# Patient Record
Sex: MALE | Race: OTHER | HISPANIC OR LATINO | Employment: FULL TIME | URBAN - METROPOLITAN AREA
[De-identification: names, ages, dates, MRNs, and addresses within clinical notes are randomized per-mention and may not be internally consistent; named-entity substitution may affect disease eponyms.]

---

## 2018-09-08 ENCOUNTER — APPOINTMENT (EMERGENCY)
Dept: CT IMAGING | Facility: HOSPITAL | Age: 33
End: 2018-09-08

## 2018-09-08 ENCOUNTER — HOSPITAL ENCOUNTER (EMERGENCY)
Facility: HOSPITAL | Age: 33
Discharge: HOME | End: 2018-09-08
Attending: EMERGENCY MEDICINE | Admitting: EMERGENCY MEDICINE

## 2018-09-08 VITALS
HEART RATE: 62 BPM | DIASTOLIC BLOOD PRESSURE: 95 MMHG | BODY MASS INDEX: 23.43 KG/M2 | SYSTOLIC BLOOD PRESSURE: 132 MMHG | TEMPERATURE: 98.4 F | RESPIRATION RATE: 16 BRPM | WEIGHT: 154.1 LBS | OXYGEN SATURATION: 96 %

## 2018-09-08 DIAGNOSIS — R51.9 HEADACHE: Primary | ICD-10-CM

## 2018-09-08 PROCEDURE — 96375 TX/PRO/DX INJ NEW DRUG ADDON: CPT

## 2018-09-08 PROCEDURE — 99284 EMERGENCY DEPT VISIT MOD MDM: CPT

## 2018-09-08 PROCEDURE — 96365 THER/PROPH/DIAG IV INF INIT: CPT

## 2018-09-08 PROCEDURE — 70450 CT HEAD/BRAIN W/O DYE: CPT

## 2018-09-08 PROCEDURE — 96374 THER/PROPH/DIAG INJ IV PUSH: CPT

## 2018-09-08 PROCEDURE — 96361 HYDRATE IV INFUSION ADD-ON: CPT

## 2018-09-08 RX ORDER — METOCLOPRAMIDE HYDROCHLORIDE 5 MG/ML
10 INJECTION INTRAMUSCULAR; INTRAVENOUS ONCE
Status: COMPLETED | OUTPATIENT
Start: 2018-09-08 | End: 2018-09-08

## 2018-09-08 RX ORDER — DIPHENHYDRAMINE HYDROCHLORIDE 50 MG/ML
25 INJECTION INTRAMUSCULAR; INTRAVENOUS ONCE
Status: COMPLETED | OUTPATIENT
Start: 2018-09-08 | End: 2018-09-08

## 2018-09-08 RX ORDER — MAGNESIUM SULFATE HEPTAHYDRATE 40 MG/ML
2 INJECTION, SOLUTION INTRAVENOUS ONCE
Status: COMPLETED | OUTPATIENT
Start: 2018-09-08 | End: 2018-09-08

## 2018-09-08 RX ORDER — KETOROLAC TROMETHAMINE 30 MG/ML
30 INJECTION, SOLUTION INTRAMUSCULAR; INTRAVENOUS ONCE
Status: COMPLETED | OUTPATIENT
Start: 2018-09-08 | End: 2018-09-08

## 2018-09-08 RX ADMIN — MAGNESIUM SULFATE HEPTAHYDRATE 2 G: 40 INJECTION, SOLUTION INTRAVENOUS at 18:57

## 2018-09-08 RX ADMIN — KETOROLAC TROMETHAMINE 30 MG: 30 INJECTION, SOLUTION INTRAMUSCULAR at 18:56

## 2018-09-08 RX ADMIN — METOCLOPRAMIDE 10 MG: 5 INJECTION, SOLUTION INTRAMUSCULAR; INTRAVENOUS at 18:56

## 2018-09-08 RX ADMIN — DIPHENHYDRAMINE HYDROCHLORIDE 25 MG: 50 INJECTION, SOLUTION INTRAMUSCULAR; INTRAVENOUS at 18:56

## 2018-09-08 RX ADMIN — SODIUM CHLORIDE 1000 ML: 0.9 INJECTION, SOLUTION INTRAVENOUS at 18:55

## 2018-09-08 NOTE — ED PROVIDER NOTES
History  Chief Complaint   Patient presents with    Headache     frontal headache since this am , +nausea and vomited X1, +photophobia     65-year-old male presents today complaining of a frontal headache which started this morning  Took 200 mg of ibuprofen without relief  Headache was gradual in onset  Similar to prior headaches but worse  History provided by:  Patient  Headache   Pain location:  Frontal  Quality:  Dull  Radiates to:  Does not radiate  Severity currently:  8/10  Severity at highest:  8/10  Onset quality:  Gradual  Timing:  Constant  Progression:  Unchanged  Chronicity:  New  Similar to prior headaches: yes    Relieved by:  Nothing  Worsened by:  Nothing  Ineffective treatments: Took 200 mg of ibuprofen without relief  Associated symptoms: nausea, photophobia and vomiting    Associated symptoms: no abdominal pain, no back pain, no blurred vision, no congestion, no cough, no diarrhea, no dizziness, no drainage, no ear pain, no eye pain, no facial pain, no fatigue, no fever, no focal weakness, no hearing loss, no loss of balance, no myalgias, no near-syncope, no neck pain, no neck stiffness, no numbness, no paresthesias, no seizures, no sinus pressure, no sore throat, no swollen glands, no syncope, no tingling, no URI, no visual change and no weakness    Risk factors: no anger, no family hx of SAH, does not have insomnia and lifestyle not sedentary        None       History reviewed  No pertinent past medical history  History reviewed  No pertinent surgical history  History reviewed  No pertinent family history  I have reviewed and agree with the history as documented  Social History   Substance Use Topics    Smoking status: Never Smoker    Smokeless tobacco: Never Used    Alcohol use No        Review of Systems   Constitutional: Negative for fatigue and fever  HENT: Negative for congestion, ear pain, hearing loss, postnasal drip, sinus pressure and sore throat      Eyes: Positive for photophobia  Negative for blurred vision and pain  Respiratory: Negative for cough  Cardiovascular: Negative for syncope and near-syncope  Gastrointestinal: Positive for nausea and vomiting  Negative for abdominal pain and diarrhea  Genitourinary: Negative for difficulty urinating  Musculoskeletal: Negative for back pain, myalgias, neck pain and neck stiffness  Skin: Negative for pallor, rash and wound  Allergic/Immunologic: Negative for immunocompromised state  Neurological: Positive for headaches  Negative for dizziness, focal weakness, seizures, weakness, numbness, paresthesias and loss of balance  Psychiatric/Behavioral: Negative for confusion  Physical Exam  Physical Exam   Constitutional: He is oriented to person, place, and time  He appears well-developed and well-nourished  He appears distressed (Appears uncomfortable)  HENT:   Head: Normocephalic and atraumatic  Mouth/Throat: Uvula is midline, oropharynx is clear and moist and mucous membranes are normal  No tonsillar exudate  Eyes: EOM are normal  Pupils are equal, round, and reactive to light  Significant photophobia   Neck: Normal range of motion  Neck supple  No neck rigidity  No Brudzinski's sign and no Kernig's sign noted  Cardiovascular: Normal rate and regular rhythm  Pulmonary/Chest: Effort normal and breath sounds normal    Abdominal: Soft  Bowel sounds are normal  There is no tenderness  There is no rebound and no guarding  Musculoskeletal: Normal range of motion  Neurological: He is alert and oriented to person, place, and time  No neurologic deficits  Speech is clear and not slurred  No word finding issues  Strength equal upper extremities b/l  Strength equal in lower extremities b/l  Able to hold extremities against gravity x 10 seconds without drift x 4  No pronator drift  No sensory deficit  Skin: Skin is warm and dry  Capillary refill takes less than 2 seconds  Psychiatric: He has a normal mood and affect  Nursing note and vitals reviewed  Vital Signs  ED Triage Vitals   Temperature Pulse Respirations Blood Pressure SpO2   09/08/18 1837 09/08/18 1837 09/08/18 1837 09/08/18 1837 09/08/18 1837   98 4 °F (36 9 °C) 78 16 149/86 98 %      Temp Source Heart Rate Source Patient Position - Orthostatic VS BP Location FiO2 (%)   09/08/18 1837 09/08/18 2003 09/08/18 1837 09/08/18 1837 --   Oral Monitor Lying Right arm       Pain Score       09/08/18 1837       8           Vitals:    09/08/18 1837 09/08/18 2003 09/08/18 2030   BP: 149/86 130/76 132/95   Pulse: 78 58 62   Patient Position - Orthostatic VS: Lying Lying Lying       Visual Acuity      ED Medications  Medications   sodium chloride 0 9 % bolus 1,000 mL (0 mL Intravenous Stopped 9/8/18 2000)   diphenhydrAMINE (BENADRYL) injection 25 mg (25 mg Intravenous Given 9/8/18 1856)   metoclopramide (REGLAN) injection 10 mg (10 mg Intravenous Given 9/8/18 1856)   ketorolac (TORADOL) injection 30 mg (30 mg Intravenous Given 9/8/18 1856)   magnesium sulfate 2 g/50 mL IVPB (premix) 2 g (0 g Intravenous Stopped 9/8/18 1957)       Diagnostic Studies  Results Reviewed     None                 CT head wo contrast   Final Result by Elayne Mcgovern MD (09/08 2000)   1  No acute intracranial abnormality  2   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 is suboptimally evaluated  Nonemergent MR evaluation may be considered as previously suggested  Workstation performed: [id number]                    Procedures  Procedures       Phone Contacts  ED Phone Contact    ED Course                               MDM  Number of Diagnoses or Management Options  Headache: new and requires workup  Diagnosis management comments: 7:57 PM  Feeling better after migraine cocktail  Headache resolved  Waiting for CT head         Amount and/or Complexity of Data Reviewed  Tests in the radiology section of CPT®: ordered and reviewed  Independent visualization of images, tracings, or specimens: yes    Risk of Complications, Morbidity, and/or Mortality  Presenting problems: high  Diagnostic procedures: high  Management options: moderate    Patient Progress  Patient progress: improved    CritCare Time    Disposition  Final diagnoses:   Headache     Time reflects when diagnosis was documented in both MDM as applicable and the Disposition within this note     Time User Action Codes Description Comment    9/8/2018  6:48 PM Rubin Brandt Add [R51] Headache       ED Disposition     ED Disposition Condition Comment    Discharge  Rajesh Cummings discharge to home/self care  Condition at discharge: Stable        Follow-up Information     Follow up With Specialties Details Why Contact Info Additional [address] Emergency Department Emergency Medicine  If symptoms worsen [address] [facility]  AN ED,  [address], [place], [place], [address]    your PCP  Schedule an appointment as soon as possible for a visit             There are no discharge medications for this patient  No discharge procedures on file      ED Provider  Electronically Signed by           Shaq Hummel DO  09/09/18 0528

## 2018-09-08 NOTE — DISCHARGE INSTRUCTIONS
Acute Headache   WHAT YOU NEED TO KNOW:   An acute headache is pain or discomfort that starts suddenly and gets worse quickly  You may have an acute headache only when you feel stress or eat certain foods  Other acute headache pain can happen every day, and sometimes several times a day  DISCHARGE INSTRUCTIONS:   Return to the emergency department if:   · You have severe pain  · You have numbness or weakness on one side of your face or body  · You have a headache that occurs after a blow to the head, a fall, or other trauma  · You have a headache, are forgetful or confused, or have trouble speaking  · You have a headache, stiff neck, and a fever  Contact your healthcare provider if:   · You have a constant headache and are vomiting  · You have a headache each day that does not get better, even after treatment  · You have changes in your headaches, or new symptoms that occur when you have a headache  · You have questions or concerns about your condition or care  Medicines: You may need any of the following:  · Prescription pain medicine  may be given  The medicine your healthcare provider recommends will depend on the kind of headaches you have  You will need to take prescription headache medicines as directed to prevent a problem called rebound headache  These headaches happen with regular use of pain relievers for headache disorders  · NSAIDs , such as ibuprofen, help decrease swelling, pain, and fever  This medicine is available with or without a doctor's order  NSAIDs can cause stomach bleeding or kidney problems in certain people  If you take blood thinner medicine, always ask your healthcare provider if NSAIDs are safe for you  Always read the medicine label and follow directions  · Acetaminophen  decreases pain and fever  It is available without a doctor's order  Ask how much to take and how often to take it  Follow directions   Read the labels of all other medicines you are using to see if they also contain acetaminophen, or ask your doctor or pharmacist  Acetaminophen can cause liver damage if not taken correctly  Do not use more than 3 grams (3,000 milligrams) total of acetaminophen in one day  · Antidepressants  may be given for some kinds of headaches  · Take your medicine as directed  Contact your healthcare provider if you think your medicine is not helping or if you have side effects  Tell him or her if you are allergic to any medicine  Keep a list of the medicines, vitamins, and herbs you take  Include the amounts, and when and why you take them  Bring the list or the pill bottles to follow-up visits  Carry your medicine list with you in case of an emergency  Manage your symptoms:   · Apply heat or ice  on the headache area  Use a heat or ice pack  For an ice pack, you can also put crushed ice in a plastic bag  Cover the pack or bag with a towel before you apply it to your skin  Ice and heat both help decrease pain, and heat also helps decrease muscle spasms  Apply heat for 20 to 30 minutes every 2 hours  Apply ice for 15 to 20 minutes every hour  Apply heat or ice for as long and for as many days as directed  You may alternate heat and ice  · Relax your muscles  Lie down in a comfortable position and close your eyes  Relax your muscles slowly  Start at your toes and work your way up your body  · Keep a record of your headaches  Write down when your headaches start and stop  Include your symptoms and what you were doing when the headache began  Record what you ate or drank for 24 hours before the headache started  Describe the pain and where it hurts  Keep track of what you did to treat your headache and if it worked  Prevent an acute headache:   · Avoid anything that triggers an acute headache  Examples include exposure to chemicals, going to high altitude, or not getting enough sleep  Create a regular sleep routine   Go to sleep at the same time and wake up at the same time each day  Do not use electronic devices before bedtime  These may trigger a headache or prevent you from sleeping well  · Do not smoke  Nicotine and other chemicals in cigarettes and cigars can trigger an acute headache or make it worse  Ask your healthcare provider for information if you currently smoke and need help to quit  E-cigarettes or smokeless tobacco still contain nicotine  Talk to your healthcare provider before you use these products  · Limit alcohol as directed  Alcohol can trigger an acute headache or make it worse  If you have cluster headaches, do not drink alcohol during an episode  For other types of headaches, ask your healthcare provider if it is safe for you to drink alcohol  Ask how much is safe for you to drink, and how often  · Exercise as directed  Exercise can reduce tension and help with headache pain  Aim for 30 minutes of physical activity on most days of the week  Your healthcare provider can help you create an exercise plan  · Eat a variety of healthy foods  Healthy foods include fruits, vegetables, low-fat dairy products, lean meats, fish, whole grains, and cooked beans  Your healthcare provider or dietitian can help you create meals plans if you need to avoid foods that trigger headaches  Follow up with your healthcare provider as directed:  Bring your headache record with you when you see your healthcare provider  Write down your questions so you remember to ask them during your visits  © 2017 [facility] Information is for End User's use only and may not be sold, redistributed or otherwise used for commercial purposes  All illustrations and images included in CareNotes® are the copyrighted property of [employer] M [employer]  or [employer]  The above information is an  only  It is not intended as medical advice for individual conditions or treatments   Talk to your doctor, nurse or pharmacist before following any medical regimen to see if it is safe and effective for you

## 2018-09-18 ENCOUNTER — HOSPITAL ENCOUNTER (EMERGENCY)
Facility: HOSPITAL | Age: 33
Discharge: HOME | End: 2018-09-18
Attending: EMERGENCY MEDICINE | Admitting: EMERGENCY MEDICINE

## 2018-09-18 VITALS
OXYGEN SATURATION: 97 % | SYSTOLIC BLOOD PRESSURE: 103 MMHG | WEIGHT: 135 LBS | TEMPERATURE: 98.2 F | RESPIRATION RATE: 18 BRPM | DIASTOLIC BLOOD PRESSURE: 60 MMHG | BODY MASS INDEX: 20.53 KG/M2 | HEART RATE: 68 BPM

## 2018-09-18 DIAGNOSIS — R51.9 HEADACHE: Primary | ICD-10-CM

## 2018-09-18 PROCEDURE — 99283 EMERGENCY DEPT VISIT LOW MDM: CPT

## 2018-09-18 PROCEDURE — 96375 TX/PRO/DX INJ NEW DRUG ADDON: CPT

## 2018-09-18 PROCEDURE — 96365 THER/PROPH/DIAG IV INF INIT: CPT

## 2018-09-18 RX ORDER — METOCLOPRAMIDE HYDROCHLORIDE 5 MG/ML
10 INJECTION INTRAMUSCULAR; INTRAVENOUS ONCE
Status: COMPLETED | OUTPATIENT
Start: 2018-09-18 | End: 2018-09-18

## 2018-09-18 RX ORDER — MAGNESIUM SULFATE HEPTAHYDRATE 40 MG/ML
2 INJECTION, SOLUTION INTRAVENOUS ONCE
Status: COMPLETED | OUTPATIENT
Start: 2018-09-18 | End: 2018-09-18

## 2018-09-18 RX ORDER — KETOROLAC TROMETHAMINE 30 MG/ML
30 INJECTION, SOLUTION INTRAMUSCULAR; INTRAVENOUS ONCE
Status: COMPLETED | OUTPATIENT
Start: 2018-09-18 | End: 2018-09-18

## 2018-09-18 RX ORDER — DIPHENHYDRAMINE HYDROCHLORIDE 50 MG/ML
50 INJECTION INTRAMUSCULAR; INTRAVENOUS ONCE
Status: COMPLETED | OUTPATIENT
Start: 2018-09-18 | End: 2018-09-18

## 2018-09-18 RX ADMIN — MAGNESIUM SULFATE HEPTAHYDRATE 2 G: 40 INJECTION, SOLUTION INTRAVENOUS at 06:15

## 2018-09-18 RX ADMIN — METOCLOPRAMIDE 10 MG: 5 INJECTION, SOLUTION INTRAMUSCULAR; INTRAVENOUS at 06:08

## 2018-09-18 RX ADMIN — SODIUM CHLORIDE 1000 ML: 0.9 INJECTION, SOLUTION INTRAVENOUS at 06:07

## 2018-09-18 RX ADMIN — DIPHENHYDRAMINE HYDROCHLORIDE 50 MG: 50 INJECTION, SOLUTION INTRAMUSCULAR; INTRAVENOUS at 06:11

## 2018-09-18 RX ADMIN — KETOROLAC TROMETHAMINE 30 MG: 30 INJECTION, SOLUTION INTRAMUSCULAR at 06:10

## 2018-09-18 NOTE — DISCHARGE INSTRUCTIONS
Acute Headache, Ambulatory Care   GENERAL INFORMATION:   An acute headache  is pain or discomfort that starts suddenly and gets worse quickly  The cause of an acute headache may not be known  It may be triggered by stress, fatigue, hormones, food, or trauma  Common related symptoms include the following:   · Fever    · Sinus pressure    · Loss of memory    · Nausea or vomiting    · Problems with your vision, such as watery or red eyes, loss of vision, or pain in bright light    · Stiff neck    · Tenderness of the head and neck area    · Trouble staying awake, or being less alert than usual     · Weakness or less energy  Seek immediate care for the following symptoms:   · Severe pain    · A headache that occurs after a blow to the head, a fall, or other trauma     · Confusion or forgetfulness    · Numbness on one side of your face or body  Treatment for an acute headache  may include medicine to decrease pain  You may also need biofeedback or cognitive behavioral therapy  Ask your healthcare provider about these and other treatments for an acute headache  Manage my symptoms:   · Apply heat  on your head for 20 to 30 minutes every 2 hours for as many days as directed  Heat helps decrease pain and muscle spasms  You may alternate heat and ice  · Apply ice  on your head for 15 to 20 minutes every hour or as directed  Use an ice pack, or put crushed ice in a plastic bag  Cover it with a towel  Ice helps decrease pain  · Relax your muscles  Lie down in a comfortable position and close your eyes  Relax your muscles slowly  Start at your toes and work your way up your body  · Keep a record of your headaches  Write down when your headaches start and stop  Include your symptoms and what you were doing when the headache began  Record what you ate or drank for 24 hours before the headache started  Describe the pain and where it hurts  Keep track of what you did to treat your headache and whether it worked    Follow up with your healthcare provider as directed:  Bring your headache record with you when you see your healthcare provider  Write down your questions so you remember to ask them during your visits  CARE AGREEMENT:   You have the right to help plan your care  Learn about your health condition and how it may be treated  Discuss treatment options with your caregivers to decide what care you want to receive  You always have the right to refuse treatment  The above information is an  only  It is not intended as medical advice for individual conditions or treatments  Talk to your doctor, nurse or pharmacist before following any medical regimen to see if it is safe and effective for you  © 2014 [address] is for End User's use only and may not be sold, redistributed or otherwise used for commercial purposes  All illustrations and images included in CareNotes® are the copyrighted property of A D A M [employer]  or [employer]

## 2018-09-18 NOTE — ED PROVIDER NOTES
History  Chief Complaint   Patient presents with    Headache     pt c/o headache for 2 weeks on and off  c/o changes in vision at times  +nausea  -vomiting       History provided by:  Patient   used: No    Headache   Associated symptoms: nausea and photophobia    Associated symptoms: no abdominal pain, no congestion, no cough, no diarrhea, no dizziness, no fever, no neck pain, no neck stiffness, no sore throat, no vomiting and no weakness      Patient is a 77-year-old male presenting to emergency department with headache  On off for last 2 weeks  Today it is worse  Nausea  No vomiting  Light bothers his eyes  Had similar episode over 2 weeks ago  Was evaluated in the ER, had negative CT head  Improved symptomatically  Has not seen a neurologist   No neck pain  No fevers or chills  No trauma  No chest pain shortness of breath  MDM headache, will treat symptomatically, re-evaluate        None       History reviewed  No pertinent past medical history  History reviewed  No pertinent surgical history  History reviewed  No pertinent family history  I have reviewed and agree with the history as documented  Social History   Substance Use Topics    Smoking status: Never Smoker    Smokeless tobacco: Never Used    Alcohol use No        Review of Systems   Constitutional: Negative for chills, diaphoresis and fever  HENT: Negative for congestion and sore throat  Eyes: Positive for photophobia  Respiratory: Negative for cough, shortness of breath, wheezing and stridor  Cardiovascular: Negative for chest pain, palpitations and leg swelling  Gastrointestinal: Positive for nausea  Negative for abdominal pain, blood in stool, diarrhea and vomiting  Genitourinary: Negative for dysuria, frequency and urgency  Musculoskeletal: Negative for neck pain and neck stiffness  Skin: Negative for pallor and rash  Neurological: Positive for headaches   Negative for dizziness, syncope, weakness and light-headedness  All other systems reviewed and are negative  Physical Exam  Physical Exam   Constitutional: He is oriented to person, place, and time  He appears well-developed and well-nourished  HENT:   Head: Normocephalic and atraumatic  Eyes: Pupils are equal, round, and reactive to light  Neck: Neck supple  Cardiovascular: Normal rate, regular rhythm, normal heart sounds and intact distal pulses  Pulmonary/Chest: Effort normal and breath sounds normal  No respiratory distress  Abdominal: Soft  Bowel sounds are normal  There is no tenderness  Musculoskeletal: Normal range of motion  He exhibits no edema or tenderness  Neurological: He is alert and oriented to person, place, and time  No cranial nerve deficit or sensory deficit  He exhibits normal muscle tone  Coordination normal    nonfocal   Skin: Skin is warm and dry  Capillary refill takes less than 2 seconds  No erythema  No pallor  Vitals reviewed        Vital Signs  ED Triage Vitals   Temperature Pulse Respirations Blood Pressure SpO2   09/18/18 0511 09/18/18 0453 09/18/18 0453 09/18/18 0453 09/18/18 0453   98 2 °F (36 8 °C) 74 16 136/82 98 %      Temp Source Heart Rate Source Patient Position - Orthostatic VS BP Location FiO2 (%)   09/18/18 0453 09/18/18 0700 09/18/18 0700 09/18/18 0700 --   Oral Monitor Lying Right arm       Pain Score       09/18/18 0453       8           Vitals:    09/18/18 0453 09/18/18 0700   BP: 136/82 103/60   Pulse: 74 68   Patient Position - Orthostatic VS:  Lying       Visual Acuity      ED Medications  Medications   diphenhydrAMINE (BENADRYL) injection 50 mg (50 mg Intravenous Given 9/18/18 0611)   ketorolac (TORADOL) injection 30 mg (30 mg Intravenous Given 9/18/18 0610)   metoclopramide (REGLAN) injection 10 mg (10 mg Intravenous Given 9/18/18 0608)   sodium chloride 0 9 % bolus 1,000 mL (0 mL Intravenous Stopped 9/18/18 0713)   magnesium sulfate 2 g/50 mL IVPB (premix) 2 g (0 g Intravenous Stopped 9/18/18 7920)       Diagnostic Studies  Results Reviewed     None                 No orders to display              Procedures  Procedures       Phone Contacts  ED Phone Contact    ED Course  ED Course as of Sep 20 1253   Tue Sep 18, 2018   8261 Headache improved  Wants to go home  Does not want any more medications  Will discharge home  [facility]  CritCare Time    Disposition  Final diagnoses:   Headache     Time reflects when diagnosis was documented in both MDM as applicable and the Disposition within this note     Time User Action Codes Description Comment    9/18/2018  6:32 AM Nava Cortez [R51] Headache       ED Disposition     ED Disposition Condition Comment    Discharge  Alana Reyes discharge to home/self care  Condition at discharge: Good        Follow-up Information     Follow up With Specialties Details Why Contact Info Additional [address] Emergency Department Emergency Medicine  As needed, If symptoms worsen [address] [facility] [address] [facility] [address]  AN ED, [address], [place], [address] [facility] Schedule an appointment as soon as possible for a visit in 5 days headaches and need MRI outpatient [address] [facility] [address] [phone]     [employer]  Call to get a family doctor [phone]             There are no discharge medications for this patient  No discharge procedures on file      ED Provider  Electronically Signed by           Vinod Mcmanus MD  09/20/18 3174

## 2022-07-21 ENCOUNTER — HOSPITAL ENCOUNTER (EMERGENCY)
Facility: HOSPITAL | Age: 37
Discharge: HOME | End: 2022-07-21
Attending: EMERGENCY MEDICINE | Admitting: EMERGENCY MEDICINE

## 2022-07-21 VITALS
SYSTOLIC BLOOD PRESSURE: 137 MMHG | TEMPERATURE: 98 F | OXYGEN SATURATION: 100 % | HEART RATE: 75 BPM | RESPIRATION RATE: 20 BRPM | DIASTOLIC BLOOD PRESSURE: 103 MMHG

## 2022-07-21 DIAGNOSIS — M79.632 LEFT FOREARM PAIN: ICD-10-CM

## 2022-07-21 DIAGNOSIS — M25.562 KNEE PAIN, LEFT: Primary | ICD-10-CM

## 2022-07-21 PROCEDURE — 99284 EMERGENCY DEPT VISIT MOD MDM: CPT | Performed by: EMERGENCY MEDICINE

## 2022-07-21 PROCEDURE — 99283 EMERGENCY DEPT VISIT LOW MDM: CPT

## 2022-07-21 NOTE — ED PROVIDER NOTES
History  Chief Complaint   Patient presents with    Leg Pain     Left leg buckled while at work today  C/o of left knee pain  Reports started with left arm soreness for one week  Patient is a 44year old male with no significant past medical history, presenting to the ED for evaluation left knee pain and left pain  Patient states that he was at work today walking around and his left knee buckled underneath him  Patient did not fall to the ground, did note some pain while ambulating for the rest the day today  Patient also notes that for the last week he has had some left arm soreness particularly the in the left forearm that radiates to the wrist area  No trauma to the arm  No rashes or wounds  Patient still has full range of motion of the shoulder  Patient does state that he lifts heavy objects at work and is constantly using both of his arms at work  Patient denies any weakness in the arms  He denies any fevers, chills, recent tick bites, chest pain, shortness of breath, abdominal symptoms, focal extremity weakness  He occasionally has some tingling in the left fingers, but is very transient  Patient states that he was still able to ambulate on his knee today but with pain, prompting ED evaluation  None       History reviewed  No pertinent past medical history  History reviewed  No pertinent surgical history  History reviewed  No pertinent family history  I have reviewed and agree with the history as documented  E-Cigarette/Vaping     E-Cigarette/Vaping Substances     Social History     Tobacco Use    Smoking status: Never Smoker    Smokeless tobacco: Never Used   Substance Use Topics    Alcohol use: No    Drug use: No        Review of Systems   Constitutional: Negative for chills and fever  HENT: Negative for ear pain and sore throat  Eyes: Negative for pain and visual disturbance  Respiratory: Negative for cough and shortness of breath      Cardiovascular: Negative for chest pain and palpitations  Gastrointestinal: Negative for abdominal pain and vomiting  Genitourinary: Negative for dysuria and hematuria  Musculoskeletal: Positive for arthralgias and myalgias  Negative for back pain  Skin: Negative for color change and rash  Neurological: Negative for dizziness, seizures, syncope and headaches  All other systems reviewed and are negative  Physical Exam  ED Triage Vitals [07/21/22 1619]   Temperature Pulse Respirations Blood Pressure SpO2   98 °F (36 7 °C) 75 20 (!) 137/103 100 %      Temp Source Heart Rate Source Patient Position - Orthostatic VS BP Location FiO2 (%)   Oral Monitor Sitting Left arm --      Pain Score       6             Orthostatic Vital Signs  Vitals:    07/21/22 1619   BP: (!) 137/103   Pulse: 75   Patient Position - Orthostatic VS: Sitting       Physical Exam  Vitals and nursing note reviewed  Constitutional:       General: He is not in acute distress  Appearance: Normal appearance  He is well-developed and normal weight  He is not ill-appearing, toxic-appearing or diaphoretic  HENT:      Head: Normocephalic and atraumatic  Right Ear: External ear normal       Left Ear: External ear normal       Nose: Nose normal  No congestion  Mouth/Throat:      Mouth: Mucous membranes are moist       Pharynx: Oropharynx is clear  Eyes:      General: No scleral icterus  Extraocular Movements: Extraocular movements intact  Conjunctiva/sclera: Conjunctivae normal    Cardiovascular:      Rate and Rhythm: Normal rate and regular rhythm  Pulses: Normal pulses  Heart sounds: Normal heart sounds  No murmur heard  Pulmonary:      Effort: Pulmonary effort is normal  No respiratory distress  Breath sounds: Normal breath sounds  Abdominal:      General: Abdomen is flat  Palpations: Abdomen is soft  Tenderness: There is no abdominal tenderness     Musculoskeletal:      Cervical back: Normal range of motion and neck supple  No tenderness  Right knee: Normal       Left knee: No swelling, deformity, effusion, erythema, ecchymosis, lacerations, bony tenderness or crepitus  Normal range of motion  Tenderness present over the lateral joint line  No LCL laxity, MCL laxity, ACL laxity or PCL laxity  Normal alignment, normal meniscus and normal patellar mobility  Normal pulse  Comments: Patient has some mild soreness in the musculature of the left forearm, with pain exacerbated with flexion and extension of the wrist as well as pronation supination of the elbow  Pulses are normal   There is no palpable swelling no bruising  Patient has full range of motion of the shoulder without pain  Skin:     General: Skin is warm and dry  Findings: No erythema or rash  Neurological:      General: No focal deficit present  Mental Status: He is alert and oriented to person, place, and time  Cranial Nerves: No cranial nerve deficit  Sensory: No sensory deficit  Motor: No weakness  Gait: Gait normal    Psychiatric:         Mood and Affect: Mood normal          ED Medications  Medications - No data to display    Diagnostic Studies  Results Reviewed     None                 No orders to display         Procedures  Procedures      ED Course        given absence of trauma, discussed the benefits and limitations of getting an x-ray  I explained to the patient that he likely does not have any acute injury to the knee  Shared decision making was used to defer x-ray at this time  I offered patient an Ace wrap for his knee for stability  He is agreeable with this  Patient states that he does not have medical insurance at this time and prefers to treat his pain with Motrin and Tylenol at home    I discussed with patient that should he continue to have pain despite use of the Ace wrap and some supportive care at home including ice, elevation, anti-inflammatories, that he should come back to the ED for potential x-ray as well as ortho consult  Patient was provided with an Ortho consult on discharge paperwork  Regarding his left arm pain, I believe that this is mostly due to inflammatory changes and potentially tendonitis given overuse syndrome at work  Advised him that the motion Tylenol that he takes for his knee will also work for his inflammation in the left arm  Should patient continue to experience pain, numbness and tingling, or reduced function of the left arm, he should be seen in the ED immediately for re-evaluation  Patient is agreeable at with the plan and I verbalized understanding  Ace wrap was applied by ED RN, and patient was observed ambulatory from the ED without issue  SBIRT 20yo+    Flowsheet Row Most Recent Value   SBIRT (25 yo +)    In order to provide better care to our patients, we are screening all of our patients for alcohol and drug use  Would it be okay to ask you these screening questions? No Filed at: 07/21/2022 1816                MDM    Disposition  Final diagnoses:   Knee pain, left   Left forearm pain     Time reflects when diagnosis was documented in both MDM as applicable and the Disposition within this note     Time User Action Codes Description Comment    7/21/2022  6:46 PM Harmeet Forge Add [M25 562] Knee pain, left     7/21/2022  6:47 PM Harmeet Forge Add [M12 561] Left forearm pain     7/21/2022  6:47 PM Dapmaximinoe Shiraz [X50  3XXA] Overuse injury     7/21/2022  6:47 PM Aneita Shutters [X50  3XXA] Overuse injury       ED Disposition     ED Disposition   Discharge    Condition   Stable    Date/Time   Thu Jul 21, 2022  6:46 PM    Comment   Trey Reyes discharge to home/self care                 Follow-up Information     Follow up With Specialties Details Why Contact Info Additional [phone] [facility] Orthopedic Surgery Call  If symptoms worsen [address] [phone] [address], [address], [address] [facility], [address]  Use Entrance A           There are no discharge medications for this patient  PDMP Review     None           ED Provider  Attending physically available and evaluated Johnsuhas Apple Aric  I managed the patient along with the ED Attending      Electronically Signed by         Yadira Louise DO  07/21/22 2001

## 2022-07-21 NOTE — DISCHARGE INSTRUCTIONS
Please use ace wrap as discussed for stability  Can remove at night  Use ice and elevate the leg to reduce any potential swelling  Motrin and Tylenol for pain control both for the knee and the L arm  I believe the L arm pain is associated with some mild inflammation in the elbow and flexor/extensor muscles of the forearm likely due to overuse at work  I have given you ortho follow up  Please see how the knee is doing over the next few days and if pain does not start to improve using therapies discussed above, please return to the ED for further workup  Return to the ED with any new/concerning issues

## 2022-07-21 NOTE — ED ATTENDING ATTESTATION
7/21/2022  Justine BALES DO, saw and evaluated the patient  I have discussed the patient with the resident/non-physician practitioner and agree with the resident's/non-physician practitioner's findings, Plan of Care, and MDM as documented in the resident's/non-physician practitioner's note, except where noted  All available labs and Radiology studies were reviewed  I was present for key portions of any procedure(s) performed by the resident/non-physician practitioner and I was immediately available to provide assistance  At this point I agree with the current assessment done in the Emergency Department  I have conducted an independent evaluation of this patient a history and physical is as follows:  Kristen BALES DO, saw and evaluated the patient  I have discussed the patient with the resident and agree with the resident's findings, Plan of Care, and MDM as documented in the resident's note, except where noted  All available labs and Radiology studies were reviewed  I was present for key portions of any procedure(s) performed by the resident and I was immediately available to provide assistance  At this point I agree with the current assessment done in the Emergency Department  I have conducted an independent evaluation of this patient a history and physical is as follows:    Sonny Mcdermott is an 44y o  year old male, otherwise healthy, who presents to the ED today with L knee pain since earlier today and L arm pain for one week  Arm pain is described as soreness and throughout the arm  He is R-handed  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  No HS, LOC  No numbness, tingling, weakness  No bleeding  No recent illnesses, fevers, n/v/d, new back pain today, or pain anywhere else  ROS otherwise negative  General: NAD   HEENT: normocephalic, atraumatic   MMM   CV: RRR   Pulm: normal work of breathing  GI: abdomen soft, non-distended, non-tender   : deferred   MSK: strength 5/5 in LUE and LLE  No laxity appreciated on stressing of the L knee  Normal gait  No point tenderness in the wrist, hand or elbow  Normal LUE and LLE pulses  No overlying skin changes  Pain in the volar forearm with wrist flexion and extension  Skin: warm and dry   Neuro: awake and alert, moves all extremities with good strength, face symmetric, speech normal   Psych: appropriate mood and affect       MDM  N/v intact, no significant trauma, no instability, able to ambulate  Likely strain/sprain in both areas  Patient declines x-rays at this time    Anticipated Disposition:  D/c with ortho f/u, RTER precautions      I have personally reviewed the laboratory studies and imaging studies as ordered by the resident/ROSAMARIA  I have personally examined the patient              ED Course         Critical Care Time  Procedures

## 2022-09-11 ENCOUNTER — HOSPITAL ENCOUNTER (EMERGENCY)
Facility: HOSPITAL | Age: 37
Discharge: HOME | End: 2022-09-11
Attending: EMERGENCY MEDICINE

## 2022-09-11 VITALS
TEMPERATURE: 98 F | RESPIRATION RATE: 18 BRPM | DIASTOLIC BLOOD PRESSURE: 88 MMHG | HEIGHT: 68 IN | OXYGEN SATURATION: 98 % | SYSTOLIC BLOOD PRESSURE: 140 MMHG | BODY MASS INDEX: 28.9 KG/M2 | WEIGHT: 190.7 LBS | HEART RATE: 81 BPM

## 2022-09-11 DIAGNOSIS — R51.9 HEADACHE: Primary | ICD-10-CM

## 2022-09-11 DIAGNOSIS — R11.0 NAUSEA: ICD-10-CM

## 2022-09-11 PROCEDURE — 96375 TX/PRO/DX INJ NEW DRUG ADDON: CPT

## 2022-09-11 PROCEDURE — 96365 THER/PROPH/DIAG IV INF INIT: CPT

## 2022-09-11 PROCEDURE — 96366 THER/PROPH/DIAG IV INF ADDON: CPT

## 2022-09-11 PROCEDURE — 99283 EMERGENCY DEPT VISIT LOW MDM: CPT

## 2022-09-11 PROCEDURE — 99284 EMERGENCY DEPT VISIT MOD MDM: CPT | Performed by: EMERGENCY MEDICINE

## 2022-09-11 RX ORDER — MAGNESIUM SULFATE HEPTAHYDRATE 40 MG/ML
2 INJECTION, SOLUTION INTRAVENOUS ONCE
Status: COMPLETED | OUTPATIENT
Start: 2022-09-11 | End: 2022-09-11

## 2022-09-11 RX ORDER — METOCLOPRAMIDE HYDROCHLORIDE 5 MG/ML
10 INJECTION INTRAMUSCULAR; INTRAVENOUS ONCE
Status: COMPLETED | OUTPATIENT
Start: 2022-09-11 | End: 2022-09-11

## 2022-09-11 RX ORDER — ACETAMINOPHEN 325 MG/1
975 TABLET ORAL ONCE
Status: COMPLETED | OUTPATIENT
Start: 2022-09-11 | End: 2022-09-11

## 2022-09-11 RX ORDER — KETOROLAC TROMETHAMINE 30 MG/ML
15 INJECTION, SOLUTION INTRAMUSCULAR; INTRAVENOUS ONCE
Status: COMPLETED | OUTPATIENT
Start: 2022-09-11 | End: 2022-09-11

## 2022-09-11 RX ORDER — DIPHENHYDRAMINE HYDROCHLORIDE 50 MG/ML
25 INJECTION INTRAMUSCULAR; INTRAVENOUS ONCE
Status: COMPLETED | OUTPATIENT
Start: 2022-09-11 | End: 2022-09-11

## 2022-09-11 RX ADMIN — DIPHENHYDRAMINE HYDROCHLORIDE 25 MG: 50 INJECTION, SOLUTION INTRAMUSCULAR; INTRAVENOUS at 19:40

## 2022-09-11 RX ADMIN — MAGNESIUM SULFATE HEPTAHYDRATE 2 G: 40 INJECTION, SOLUTION INTRAVENOUS at 19:47

## 2022-09-11 RX ADMIN — SODIUM CHLORIDE 1000 ML: 0.9 INJECTION, SOLUTION INTRAVENOUS at 19:47

## 2022-09-11 RX ADMIN — ACETAMINOPHEN 975 MG: 325 TABLET ORAL at 19:24

## 2022-09-11 RX ADMIN — METOCLOPRAMIDE 10 MG: 5 INJECTION, SOLUTION INTRAMUSCULAR; INTRAVENOUS at 19:42

## 2022-09-11 RX ADMIN — KETOROLAC TROMETHAMINE 15 MG: 30 INJECTION, SOLUTION INTRAMUSCULAR at 19:46
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
## 2022-09-11 NOTE — ED ATTENDING ATTESTATION
9/11/2022  ICassie MD, saw and evaluated the patient  I have discussed the patient with the resident/non-physician practitioner and agree with the resident's/non-physician practitioner's findings, Plan of Care, and MDM as documented in the resident's/non-physician practitioner's note, except where noted  All available labs and Radiology studies were reviewed  I was present for key portions of any procedure(s) performed by the resident/non-physician practitioner and I was immediately available to provide assistance  At this point I agree with the current assessment done in the Emergency Department  I have conducted an independent evaluation of this patient a history and physical is as follows:  Headache, nonfocal examination, CT head from 2018 with 15 mm mass along posterior lateral right medulla  Patient aware  Understands need to follow-up with PCP, referral given  He has no fever, no meningismus  No thunderclap features  No imaging clinically indicated in the emergency department  Will treat with migraine cocktail, reassess and likely discharge  Review of Systems - Negative except headache    Physical Exam  Vitals and nursing note reviewed  Constitutional:       General: He is not in acute distress  Appearance: He is well-developed  He is not diaphoretic  HENT:      Head: Normocephalic and atraumatic  Right Ear: External ear normal       Left Ear: External ear normal    Eyes:      General:         Right eye: No discharge  Left eye: No discharge  Pupils: Pupils are equal, round, and reactive to light  Neck:      Thyroid: No thyromegaly  Trachea: No tracheal deviation  Cardiovascular:      Rate and Rhythm: Normal rate and regular rhythm  Heart sounds: No murmur heard  Pulmonary:      Effort: Pulmonary effort is normal       Breath sounds: Normal breath sounds  Abdominal:      General: Bowel sounds are normal  There is no distension  Palpations: Abdomen is soft  Tenderness: There is no abdominal tenderness  Musculoskeletal:         General: No deformity  Normal range of motion  Cervical back: Normal range of motion and neck supple  Skin:     General: Skin is warm  Capillary Refill: Capillary refill takes less than 2 seconds  Neurological:      Mental Status: He is alert and oriented to person, place, and time  Cranial Nerves: No cranial nerve deficit  Motor: No abnormal muscle tone  Psychiatric:         Behavior: Behavior normal        Primary headache, improved with ER management  Information given follow-up with PCP as patient will likely need an MRI as an outpatient as previously recommended  Nonfocal examination, no indication for emergent CT scan at this point  Patient's symptoms improved  Stable for discharge home  ED Course  ED Course as of 09/11/22 2057   Adelso Shields Sep 11, 2022 2041 Patient reassessed, sleeping comfortably  Will discharge after magnesium complete           Critical Care Time  Procedures

## 2022-11-16 ENCOUNTER — HOSPITAL ENCOUNTER (EMERGENCY)
Facility: HOSPITAL | Age: 37
Discharge: HOME | End: 2022-11-16
Attending: EMERGENCY MEDICINE

## 2022-11-16 ENCOUNTER — APPOINTMENT (EMERGENCY)
Dept: CT IMAGING | Facility: HOSPITAL | Age: 37
End: 2022-11-16

## 2022-11-16 VITALS
TEMPERATURE: 97.5 F | DIASTOLIC BLOOD PRESSURE: 99 MMHG | HEART RATE: 84 BPM | OXYGEN SATURATION: 100 % | SYSTOLIC BLOOD PRESSURE: 157 MMHG | RESPIRATION RATE: 20 BRPM

## 2022-11-16 DIAGNOSIS — K57.92 DIVERTICULITIS: Primary | ICD-10-CM

## 2022-11-16 LAB
ALBUMIN SERPL BCP-MCNC: 4.6 G/DL (ref 3.5–5)
ALP SERPL-CCNC: 66 U/L (ref 34–104)
ALT SERPL W P-5'-P-CCNC: 42 U/L (ref 7–52)
ANION GAP SERPL CALCULATED.3IONS-SCNC: 6 MMOL/L (ref 4–13)
AST SERPL W P-5'-P-CCNC: 20 U/L (ref 13–39)
BASOPHILS # BLD AUTO: 0.04 THOUSANDS/ÂΜL (ref 0–0.1)
BASOPHILS NFR BLD AUTO: 1 % (ref 0–1)
BILIRUB SERPL-MCNC: 0.55 MG/DL (ref 0.2–1)
BUN SERPL-MCNC: 13 MG/DL (ref 5–25)
CALCIUM SERPL-MCNC: 9.1 MG/DL (ref 8.4–10.2)
CHLORIDE SERPL-SCNC: 100 MMOL/L (ref 96–108)
CO2 SERPL-SCNC: 29 MMOL/L (ref 21–32)
CREAT SERPL-MCNC: 0.89 MG/DL (ref 0.6–1.3)
EOSINOPHIL # BLD AUTO: 0.12 THOUSAND/ÂΜL (ref 0–0.61)
EOSINOPHIL NFR BLD AUTO: 2 % (ref 0–6)
ERYTHROCYTE [DISTWIDTH] IN BLOOD BY AUTOMATED COUNT: 12.3 % (ref 11.6–15.1)
GFR SERPL CREATININE-BSD FRML MDRD: 108 ML/MIN/1.73SQ M
GLUCOSE SERPL-MCNC: 88 MG/DL (ref 65–140)
HCT VFR BLD AUTO: 42.5 % (ref 36.5–49.3)
HGB BLD-MCNC: 14.4 G/DL (ref 12–17)
IMM GRANULOCYTES # BLD AUTO: 0.01 THOUSAND/UL (ref 0–0.2)
IMM GRANULOCYTES NFR BLD AUTO: 0 % (ref 0–2)
LIPASE SERPL-CCNC: 19 U/L (ref 11–82)
LYMPHOCYTES # BLD AUTO: 3.52 THOUSANDS/ÂΜL (ref 0.6–4.47)
LYMPHOCYTES NFR BLD AUTO: 50 % (ref 14–44)
MCH RBC QN AUTO: 29.2 PG (ref 26.8–34.3)
MCHC RBC AUTO-ENTMCNC: 33.9 G/DL (ref 31.4–37.4)
MCV RBC AUTO: 86 FL (ref 82–98)
MONOCYTES # BLD AUTO: 0.45 THOUSAND/ÂΜL (ref 0.17–1.22)
MONOCYTES NFR BLD AUTO: 6 % (ref 4–12)
NEUTROPHILS # BLD AUTO: 2.87 THOUSANDS/ÂΜL (ref 1.85–7.62)
NEUTS SEG NFR BLD AUTO: 41 % (ref 43–75)
NRBC BLD AUTO-RTO: 0 /100 WBCS
PLATELET # BLD AUTO: 249 THOUSANDS/UL (ref 149–390)
PMV BLD AUTO: 8.6 FL (ref 8.9–12.7)
POTASSIUM SERPL-SCNC: 3.6 MMOL/L (ref 3.5–5.3)
PROT SERPL-MCNC: 7.6 G/DL (ref 6.4–8.4)
RBC # BLD AUTO: 4.93 MILLION/UL (ref 3.88–5.62)
SODIUM SERPL-SCNC: 135 MMOL/L (ref 135–147)
WBC # BLD AUTO: 7.01 THOUSAND/UL (ref 4.31–10.16)

## 2022-11-16 RX ORDER — CIPROFLOXACIN 500 MG/1
500 TABLET, FILM COATED ORAL ONCE
Status: COMPLETED | OUTPATIENT
Start: 2022-11-16 | End: 2022-11-16

## 2022-11-16 RX ORDER — CIPROFLOXACIN 500 MG/1
500 TABLET, FILM COATED ORAL 2 TIMES DAILY
Qty: 14 TABLET | Refills: 0 | Status: SHIPPED | OUTPATIENT
Start: 2022-11-16 | End: 2022-11-23

## 2022-11-16 RX ORDER — OXYCODONE HYDROCHLORIDE AND ACETAMINOPHEN 5; 325 MG/1; MG/1
1 TABLET ORAL EVERY 6 HOURS PRN
Qty: 12 TABLET | Refills: 0 | Status: SHIPPED | OUTPATIENT
Start: 2022-11-16 | End: 2022-11-26

## 2022-11-16 RX ORDER — ONDANSETRON 4 MG/1
4 TABLET, ORALLY DISINTEGRATING ORAL EVERY 8 HOURS PRN
Qty: 20 TABLET | Refills: 0 | Status: SHIPPED | OUTPATIENT
Start: 2022-11-16 | End: 2022-11-23

## 2022-11-16 RX ORDER — MAGNESIUM HYDROXIDE/ALUMINUM HYDROXICE/SIMETHICONE 120; 1200; 1200 MG/30ML; MG/30ML; MG/30ML
30 SUSPENSION ORAL ONCE
Status: COMPLETED | OUTPATIENT
Start: 2022-11-16 | End: 2022-11-16

## 2022-11-16 RX ORDER — ONDANSETRON 2 MG/ML
4 INJECTION INTRAMUSCULAR; INTRAVENOUS ONCE
Status: COMPLETED | OUTPATIENT
Start: 2022-11-16 | End: 2022-11-16

## 2022-11-16 RX ORDER — LIDOCAINE HYDROCHLORIDE 20 MG/ML
15 SOLUTION OROPHARYNGEAL ONCE
Status: COMPLETED | OUTPATIENT
Start: 2022-11-16 | End: 2022-11-16

## 2022-11-16 RX ORDER — KETOROLAC TROMETHAMINE 30 MG/ML
15 INJECTION, SOLUTION INTRAMUSCULAR; INTRAVENOUS ONCE
Status: COMPLETED | OUTPATIENT
Start: 2022-11-16 | End: 2022-11-16

## 2022-11-16 RX ORDER — METRONIDAZOLE 500 MG/1
500 TABLET ORAL EVERY 8 HOURS SCHEDULED
Qty: 21 TABLET | Refills: 0 | Status: SHIPPED | OUTPATIENT
Start: 2022-11-16 | End: 2022-11-23

## 2022-11-16 RX ORDER — METRONIDAZOLE 500 MG/1
500 TABLET ORAL ONCE
Status: COMPLETED | OUTPATIENT
Start: 2022-11-16 | End: 2022-11-16

## 2022-11-16 RX ADMIN — KETOROLAC TROMETHAMINE 15 MG: 30 INJECTION, SOLUTION INTRAMUSCULAR; INTRAVENOUS at 20:18

## 2022-11-16 RX ADMIN — IOHEXOL 100 ML: 350 INJECTION, SOLUTION INTRAVENOUS at 21:04

## 2022-11-16 RX ADMIN — ONDANSETRON 4 MG: 2 INJECTION INTRAMUSCULAR; INTRAVENOUS at 20:18

## 2022-11-16 RX ADMIN — METRONIDAZOLE 500 MG: 500 TABLET ORAL at 21:42

## 2022-11-16 RX ADMIN — CIPROFLOXACIN HYDROCHLORIDE 500 MG: 500 TABLET, FILM COATED ORAL at 21:42

## 2022-11-16 RX ADMIN — SODIUM CHLORIDE 1000 ML: 0.9 INJECTION, SOLUTION INTRAVENOUS at 20:17

## 2022-11-16 RX ADMIN — LIDOCAINE HYDROCHLORIDE 15 ML: 20 SOLUTION ORAL; TOPICAL at 21:19

## 2022-11-16 RX ADMIN — ALUMINUM HYDROXIDE, MAGNESIUM HYDROXIDE, AND DIMETHICONE 30 ML: 200; 20; 200 SUSPENSION ORAL at 21:19

## 2022-11-16 NOTE — Clinical Note
Winifred Rice was seen and treated in our emergency department on 11/16/2022  Diagnosis:     Darshan Hartman  may return to work on return date  He may return on this date: 11/18/2022         If you have any questions or concerns, please don't hesitate to call        Constantin Reagan DO    ______________________________           _______________          _______________  Hospital Representative                              Date                                Time

## 2022-11-17 LAB
ATRIAL RATE: 73 BPM
P AXIS: 52 DEGREES
PR INTERVAL: 158 MS
QRS AXIS: -32 DEGREES
QRSD INTERVAL: 96 MS
QT INTERVAL: 378 MS
QTC INTERVAL: 416 MS
T WAVE AXIS: 31 DEGREES
VENTRICULAR RATE: 73 BPM

## 2022-11-17 NOTE — ED PROVIDER NOTES
History  Chief Complaint   Patient presents with   • Abdominal Pain     Pt states he has been having upper abdominal pain for 2 weeks now  Pt complaining of bloating and L arm tingling  Pt vomited earlier today  21-year-old male comes in for evaluation of abdominal pain  Patient states for approximately 2 weeks he has mid epigastric pain  Patient sometimes feels nauseous no vomiting or diarrhea  Patient states pain is worse after eating  Patient states he feels bloated  The separate note patient has left arm tingling as well  He states this has been an intermittent problem for years that he has never had fully evaluated but that he is concerned because he thinks it is related to his stomach pain  Patient also has a history of a brain mass that he was supposed to get follow-up for with an MRI from a CT scan in 2018 that he had for headache  Patient never got that MRI because of “insurance”  History provided by:  Patient   used: No    Abdominal Pain  Pain location:  Epigastric  Pain quality: burning and gnawing    Pain radiates to:  Does not radiate  Pain severity:  Severe  Onset quality:  Gradual  Timing:  Constant  Progression:  Worsening  Chronicity:  New  Context: not alcohol use, not recent illness and not trauma    Ineffective treatments:  None tried  Associated symptoms: anorexia and nausea    Associated symptoms: no chest pain, no constipation, no cough, no fever, no hematemesis, no hematochezia, no hematuria and no vomiting    Risk factors: no alcohol abuse, no NSAID use and not pregnant        None       Past Medical History:   Diagnosis Date   • Migraine        History reviewed  No pertinent surgical history  History reviewed  No pertinent family history  I have reviewed and agree with the history as documented      E-Cigarette/Vaping     E-Cigarette/Vaping Substances     Social History     Tobacco Use   • Smoking status: Never   • Smokeless tobacco: Never   Substance Use Topics   • Alcohol use: No   • Drug use: No       Review of Systems   Constitutional: Negative for activity change, appetite change and fever  HENT: Negative for congestion and facial swelling  Eyes: Negative for discharge and redness  Respiratory: Negative for cough and wheezing  Cardiovascular: Negative for chest pain and leg swelling  Gastrointestinal: Positive for abdominal pain, anorexia and nausea  Negative for abdominal distention, blood in stool, constipation, hematemesis, hematochezia and vomiting  Endocrine: Negative for cold intolerance and polydipsia  Genitourinary: Negative for difficulty urinating and hematuria  Musculoskeletal: Negative for arthralgias and gait problem  Skin: Negative for color change and rash  Allergic/Immunologic: Negative for food allergies and immunocompromised state  Neurological: Negative for dizziness, seizures and headaches  Hematological: Negative for adenopathy  Does not bruise/bleed easily  Psychiatric/Behavioral: Negative for agitation, confusion and decreased concentration  All other systems reviewed and are negative  Physical Exam  Physical Exam  Vitals and nursing note reviewed  Constitutional:       Appearance: He is well-developed and well-nourished  He is not toxic-appearing  HENT:      Head: Normocephalic and atraumatic  Right Ear: Tympanic membrane normal       Left Ear: Tympanic membrane normal       Nose: Nose normal       Mouth/Throat:      Mouth: Oropharynx is clear and moist    Eyes:      General: Lids are normal       Extraocular Movements: EOM normal       Conjunctiva/sclera: Conjunctivae normal       Pupils: Pupils are equal, round, and reactive to light  Neck:      Vascular: No carotid bruit or JVD  Trachea: Trachea normal    Cardiovascular:      Rate and Rhythm: Normal rate and regular rhythm  No extrasystoles are present  Pulses: Intact distal pulses  Heart sounds: Normal heart sounds  Pulmonary:      Effort: Pulmonary effort is normal       Breath sounds: No decreased breath sounds, wheezing, rhonchi or rales  Chest:      Chest wall: No deformity or tenderness  Abdominal:      General: Bowel sounds are normal       Palpations: Abdomen is soft  Tenderness: There is abdominal tenderness in the epigastric area  There is no CVA tenderness, guarding or rebound  Musculoskeletal:      Right shoulder: No swelling, deformity or tenderness  Normal range of motion  Cervical back: Normal range of motion and neck supple  No deformity, tenderness or bony tenderness  Normal range of motion  Normal    Lymphadenopathy:      Cervical: No cervical adenopathy  Upper Body:   No axillary adenopathy present  Skin:     General: Skin is warm and dry  Neurological:      Mental Status: He is alert and oriented to person, place, and time  Cranial Nerves: No cranial nerve deficit  Sensory: No sensory deficit  Motor: Motor strength is normal       Coordination: He displays a negative Romberg sign  Deep Tendon Reflexes: Reflexes are normal and symmetric  Psychiatric:         Mood and Affect: Mood and affect normal          Speech: Speech normal          Behavior: Behavior normal          Thought Content:  Thought content normal          Cognition and Memory: Cognition and memory normal          Judgment: Judgment normal          Vital Signs  ED Triage Vitals   Temperature Pulse Respirations Blood Pressure SpO2   11/16/22 1957 11/16/22 1957 11/16/22 1957 11/16/22 1957 11/16/22 1957   97 5 °F (36 4 °C) 84 20 157/99 100 %      Temp Source Heart Rate Source Patient Position - Orthostatic VS BP Location FiO2 (%)   11/16/22 1957 -- 11/16/22 1957 11/16/22 1957 --   Oral  Sitting Right arm       Pain Score       11/16/22 2017       2           Vitals:    11/16/22 1957   BP: 157/99   Pulse: 84   Patient Position - Orthostatic VS: Sitting         Visual Acuity      ED Medications  Medications   ciprofloxacin (CIPRO) tablet 500 mg (has no administration in time range)   metroNIDAZOLE (FLAGYL) tablet 500 mg (has no administration in time range)   sodium chloride 0 9 % bolus 1,000 mL (1,000 mL Intravenous New Bag 11/16/22 2017)   ketorolac (TORADOL) injection 15 mg (15 mg Intravenous Given 11/16/22 2018)   ondansetron (ZOFRAN) injection 4 mg (4 mg Intravenous Given 11/16/22 2018)   aluminum-magnesium hydroxide-simethicone (MYLANTA) oral suspension 30 mL (30 mL Oral Given 11/16/22 2119)   Lidocaine Viscous HCl (XYLOCAINE) 2 % mucosal solution 15 mL (15 mL Swish & Spit Given 11/16/22 2119)   iohexol (OMNIPAQUE) 350 MG/ML injection (SINGLE-DOSE) 100 mL (100 mL Intravenous Given 11/16/22 2104)       Diagnostic Studies  Results Reviewed     Procedure Component Value Units Date/Time    Comprehensive metabolic panel [[id number]] Collected: 11/16/22 2021    Lab Status: Final result Specimen: Blood from Arm, Right Updated: 11/16/22 2052     Sodium 135 mmol/L      Potassium 3 6 mmol/L      Chloride 100 mmol/L      CO2 29 mmol/L      ANION GAP 6 mmol/L      BUN 13 mg/dL      Creatinine 0 89 mg/dL      Glucose 88 mg/dL      Calcium 9 1 mg/dL      AST 20 U/L      ALT 42 U/L      Alkaline Phosphatase 66 U/L      Total Protein 7 6 g/dL      Albumin 4 6 g/dL      Total Bilirubin 0 55 mg/dL      eGFR 108 ml/min/1 73sq m     Narrative:      [place] guidelines for Chronic Kidney Disease (CKD):   •  Stage 1 with normal or high GFR (GFR > 90 mL/min/1 73 square meters)  •  Stage 2 Mild CKD (GFR = 60-89 mL/min/1 73 square meters)  •  Stage 3A Moderate CKD (GFR = 45-59 mL/min/1 73 square meters)  •  Stage 3B Moderate CKD (GFR = 30-44 mL/min/1 73 square meters)  •  Stage 4 Severe CKD (GFR = 15-29 mL/min/1 73 square meters)  •  Stage 5 End Stage CKD (GFR <15 mL/min/1 73 square meters)  Note: GFR calculation is accurate only with a steady state creatinine    Lipase [[id number]] (Normal) Collected: 11/16/22 2021    Lab Status: Final result Specimen: Blood from Arm, Right Updated: 11/16/22 2052     Lipase 19 u/L     CBC and differential [[id number]]  (Abnormal) Collected: 11/16/22 2021    Lab Status: Final result Specimen: Blood from Arm, Right Updated: 11/16/22 2028     WBC 7 01 Thousand/uL      RBC 4 93 Million/uL      Hemoglobin 14 4 g/dL      Hematocrit 42 5 %      MCV 86 fL      MCH 29 2 pg      MCHC 33 9 g/dL      RDW 12 3 %      MPV 8 6 fL      Platelets 307 Thousands/uL      nRBC 0 /100 WBCs      Neutrophils Relative 41 %      Immat GRANS % 0 %      Lymphocytes Relative 50 %      Monocytes Relative 6 %      Eosinophils Relative 2 %      Basophils Relative 1 %      Neutrophils Absolute 2 87 Thousands/µL      Immature Grans Absolute 0 01 Thousand/uL      Lymphocytes Absolute 3 52 Thousands/µL      Monocytes Absolute 0 45 Thousand/µL      Eosinophils Absolute 0 12 Thousand/µL      Basophils Absolute 0 04 Thousands/µL                  CT abdomen pelvis with contrast   Final Result by Anthony Mckeon DO (11/16 2122)      Colonic diverticulosis with inflammatory changes around the sigmoid colon representing acute diverticulitis  If not already performed recently, colonoscopy after the acute illness is recommended to rule out possibility of colon cancer mimicking diverticulitis  The study was marked in [facility] for immediate notification              Workstation performed: [id number]                    Procedures  ECG 12 Lead Documentation Only    Date/Time: 11/16/2022 8:08 PM  Performed by: Geri Ortega DO  Authorized by: Ewa Guzman DO     Rate:     ECG rate:  73  Rhythm:     Rhythm: sinus rhythm    Ectopy:     Ectopy: none    QRS:     QRS axis:  Normal  Conduction:     Conduction: normal    ST segments:     ST segments:  Normal             ED Course                               SBIRT 20yo+    Flowsheet Row Most Recent Value   SBIRT (23 yo +)    In order to provide better care to our patients, we are screening all of our patients for alcohol and drug use  Would it be okay to ask you these screening questions? Unable to answer at this time Filed at: 11/16/2022 2026                    [facility]  Number of Diagnoses or Management Options  Diverticulitis: new and requires workup     Amount and/or Complexity of Data Reviewed  Clinical lab tests: ordered and reviewed  Tests in the radiology section of CPT®: ordered and reviewed  Tests in the medicine section of CPT®: ordered and reviewed  Independent visualization of images, tracings, or specimens: yes    Patient Progress  Patient progress: stable      Disposition  Final diagnoses:   Diverticulitis     Time reflects when diagnosis was documented in both MDM as applicable and the Disposition within this note     Time User Action Codes Description Comment    11/16/2022  9:25 PM Yanelis Desi Add [K57 92] Diverticulitis       ED Disposition     ED Disposition   Discharge    Condition   Stable    Date/Time   Wed Nov 16, 2022  9:25 PM    Comment   [facility] Eric discharge to home/self care                 Follow-up Information     Follow up With Specialties Details Why Contact Info Additional Information    [address] Gastroenterology [place] [address] [facility] Gastroenterology Schedule an appointment as soon as possible for a visit  Should follow up and have a colonoscopy after acute illnesses done [address]avier [address] [facility]  [phone] [facility] Gastroenterology [address], [address], [address] [phone]    [facility] Schedule an appointment as soon as possible for a visit   [address] [facility] [id number]  [facility] [address] [place] [address], [address] [facility]          Patient's Medications   Discharge Prescriptions    CIPROFLOXACIN (CIPRO) 500 MG TABLET    Take 1 tablet (500 mg total) by mouth 2 (two) times a day for 7 days       Start Date: 11/16/2022End Date: 11/23/2022       Order Dose: 500 mg       Quantity: 14 tablet    Refills: 0    METRONIDAZOLE (FLAGYL) 500 MG TABLET    Take 1 tablet (500 mg total) by mouth every 8 (eight) hours for 7 days       Start Date: 11/16/2022End Date: 11/23/2022       Order Dose: 500 mg       Quantity: 21 tablet    Refills: 0    ONDANSETRON (ZOFRAN-ODT) 4 MG DISINTEGRATING TABLET    Take 1 tablet (4 mg total) by mouth every 8 (eight) hours as needed for nausea or vomiting for up to 7 days       Start Date: 11/16/2022End Date: 11/23/2022       Order Dose: 4 mg       Quantity: 20 tablet    Refills: 0    OXYCODONE-ACETAMINOPHEN (PERCOCET) 5-325 MG PER TABLET    Take 1 tablet by mouth every 6 (six) hours as needed for moderate pain (May use up to two tablets every 6 hours ) for up to 10 days Max Daily Amount: 4 tablets       Start Date: 11/16/2022End Date: 11/26/2022       Order Dose: 1 tablet       Quantity: 12 tablet    Refills: 0       No discharge procedures on file      PDMP Review     None          ED Provider  Electronically Signed by           Crispin Diane DO  11/16/22 6554

## 2022-11-17 NOTE — ED NOTES
Discharge reviewed with patient  Patient verbalized understanding and has no further questions at this time  Patient ambulatory off unit with steady gait        Kwesi Courtney, [address] [facility]  11/16/22 9895

## 2023-06-07 ENCOUNTER — ANESTHESIA (EMERGENCY)
Dept: PERIOP | Facility: HOSPITAL | Age: 38
End: 2023-06-07
Payer: COMMERCIAL

## 2023-06-07 ENCOUNTER — HOSPITAL ENCOUNTER (OUTPATIENT)
Facility: HOSPITAL | Age: 38
Setting detail: OUTPATIENT SURGERY
Discharge: HOME | End: 2023-06-08
Attending: EMERGENCY MEDICINE | Admitting: SURGERY
Payer: COMMERCIAL

## 2023-06-07 ENCOUNTER — ANESTHESIA EVENT (EMERGENCY)
Dept: PERIOP | Facility: HOSPITAL | Age: 38
End: 2023-06-07
Payer: COMMERCIAL

## 2023-06-07 ENCOUNTER — APPOINTMENT (EMERGENCY)
Dept: CT IMAGING | Facility: HOSPITAL | Age: 38
End: 2023-06-07
Payer: COMMERCIAL

## 2023-06-07 DIAGNOSIS — K35.80 ACUTE APPENDICITIS: Primary | ICD-10-CM

## 2023-06-07 DIAGNOSIS — K35.20 ACUTE APPENDICITIS WITH PERFORATION AND GENERALIZED PERITONITIS, WITHOUT ABSCESS OR GANGRENE: ICD-10-CM

## 2023-06-07 PROBLEM — J45.909 ASTHMA: Status: ACTIVE | Noted: 2023-06-07

## 2023-06-07 PROBLEM — K21.9 GASTROESOPHAGEAL REFLUX DISEASE: Status: ACTIVE | Noted: 2023-06-07

## 2023-06-07 LAB
ALBUMIN SERPL BCP-MCNC: 4.2 G/DL (ref 3.5–5)
ALP SERPL-CCNC: 64 U/L (ref 34–104)
ALT SERPL W P-5'-P-CCNC: 38 U/L (ref 7–52)
ANION GAP SERPL CALCULATED.3IONS-SCNC: 9 MMOL/L (ref 4–13)
AST SERPL W P-5'-P-CCNC: 21 U/L (ref 13–39)
BASOPHILS # BLD AUTO: 0.03 THOUSANDS/ÂΜL (ref 0–0.1)
BASOPHILS NFR BLD AUTO: 0 % (ref 0–1)
BILIRUB SERPL-MCNC: 1.01 MG/DL (ref 0.2–1)
BILIRUB UR QL STRIP: NEGATIVE
BUN SERPL-MCNC: 10 MG/DL (ref 5–25)
CALCIUM SERPL-MCNC: 9.2 MG/DL (ref 8.4–10.2)
CHLORIDE SERPL-SCNC: 100 MMOL/L (ref 96–108)
CLARITY UR: NORMAL
CO2 SERPL-SCNC: 27 MMOL/L (ref 21–32)
COLOR UR: NORMAL
CREAT SERPL-MCNC: 0.86 MG/DL (ref 0.6–1.3)
EOSINOPHIL # BLD AUTO: 0.02 THOUSAND/ÂΜL (ref 0–0.61)
EOSINOPHIL NFR BLD AUTO: 0 % (ref 0–6)
ERYTHROCYTE [DISTWIDTH] IN BLOOD BY AUTOMATED COUNT: 12.8 % (ref 11.6–15.1)
GFR SERPL CREATININE-BSD FRML MDRD: 109 ML/MIN/1.73SQ M
GLUCOSE SERPL-MCNC: 88 MG/DL (ref 65–140)
GLUCOSE UR STRIP-MCNC: NEGATIVE MG/DL
HCT VFR BLD AUTO: 41.4 % (ref 36.5–49.3)
HGB BLD-MCNC: 13.6 G/DL (ref 12–17)
HGB UR QL STRIP.AUTO: NEGATIVE
IMM GRANULOCYTES # BLD AUTO: 0.06 THOUSAND/UL (ref 0–0.2)
IMM GRANULOCYTES NFR BLD AUTO: 1 % (ref 0–2)
KETONES UR STRIP-MCNC: NEGATIVE MG/DL
LEUKOCYTE ESTERASE UR QL STRIP: NEGATIVE
LIPASE SERPL-CCNC: 7 U/L (ref 11–82)
LYMPHOCYTES # BLD AUTO: 1.83 THOUSANDS/ÂΜL (ref 0.6–4.47)
LYMPHOCYTES NFR BLD AUTO: 15 % (ref 14–44)
MCH RBC QN AUTO: 28.7 PG (ref 26.8–34.3)
MCHC RBC AUTO-ENTMCNC: 32.9 G/DL (ref 31.4–37.4)
MCV RBC AUTO: 87 FL (ref 82–98)
MONOCYTES # BLD AUTO: 0.73 THOUSAND/ÂΜL (ref 0.17–1.22)
MONOCYTES NFR BLD AUTO: 6 % (ref 4–12)
NEUTROPHILS # BLD AUTO: 9.6 THOUSANDS/ÂΜL (ref 1.85–7.62)
NEUTS SEG NFR BLD AUTO: 78 % (ref 43–75)
NITRITE UR QL STRIP: NEGATIVE
NRBC BLD AUTO-RTO: 0 /100 WBCS
PH UR STRIP.AUTO: 6.5 [PH]
PLATELET # BLD AUTO: 210 THOUSANDS/UL (ref 149–390)
PMV BLD AUTO: 8.9 FL (ref 8.9–12.7)
POTASSIUM SERPL-SCNC: 3.6 MMOL/L (ref 3.5–5.3)
PROT SERPL-MCNC: 7.4 G/DL (ref 6.4–8.4)
PROT UR STRIP-MCNC: NEGATIVE MG/DL
RBC # BLD AUTO: 4.74 MILLION/UL (ref 3.88–5.62)
SODIUM SERPL-SCNC: 136 MMOL/L (ref 135–147)
SP GR UR STRIP.AUTO: 1.02 (ref 1–1.03)
UROBILINOGEN UR STRIP-ACNC: <2 MG/DL
WBC # BLD AUTO: 12.27 THOUSAND/UL (ref 4.31–10.16)

## 2023-06-07 PROCEDURE — 96374 THER/PROPH/DIAG INJ IV PUSH: CPT

## 2023-06-07 PROCEDURE — 85025 COMPLETE CBC W/AUTO DIFF WBC: CPT

## 2023-06-07 PROCEDURE — 99205 OFFICE O/P NEW HI 60 MIN: CPT | Performed by: SURGERY

## 2023-06-07 PROCEDURE — 99284 EMERGENCY DEPT VISIT MOD MDM: CPT

## 2023-06-07 PROCEDURE — G1004 CDSM NDSC: HCPCS

## 2023-06-07 PROCEDURE — 74177 CT ABD & PELVIS W/CONTRAST: CPT

## 2023-06-07 PROCEDURE — 36415 COLL VENOUS BLD VENIPUNCTURE: CPT

## 2023-06-07 PROCEDURE — 88304 TISSUE EXAM BY PATHOLOGIST: CPT | Performed by: PATHOLOGY

## 2023-06-07 PROCEDURE — 83690 ASSAY OF LIPASE: CPT

## 2023-06-07 PROCEDURE — 96375 TX/PRO/DX INJ NEW DRUG ADDON: CPT

## 2023-06-07 PROCEDURE — 44970 LAPAROSCOPY APPENDECTOMY: CPT | Performed by: SURGERY

## 2023-06-07 PROCEDURE — 81003 URINALYSIS AUTO W/O SCOPE: CPT

## 2023-06-07 PROCEDURE — 80053 COMPREHEN METABOLIC PANEL: CPT

## 2023-06-07 RX ORDER — SODIUM CHLORIDE, SODIUM LACTATE, POTASSIUM CHLORIDE, CALCIUM CHLORIDE 600; 310; 30; 20 MG/100ML; MG/100ML; MG/100ML; MG/100ML
INJECTION, SOLUTION INTRAVENOUS CONTINUOUS PRN
Status: DISCONTINUED | OUTPATIENT
Start: 2023-06-07 | End: 2023-06-07

## 2023-06-07 RX ORDER — CEFAZOLIN SODIUM 1 G/50ML
1000 SOLUTION INTRAVENOUS EVERY 8 HOURS
Status: COMPLETED | OUTPATIENT
Start: 2023-06-08 | End: 2023-06-08

## 2023-06-07 RX ORDER — OXYCODONE HYDROCHLORIDE 5 MG/1
5 TABLET ORAL EVERY 4 HOURS PRN
Qty: 10 TABLET | Refills: 0 | Status: SHIPPED | OUTPATIENT
Start: 2023-06-07 | End: 2023-06-17

## 2023-06-07 RX ORDER — LIDOCAINE 50 MG/G
1 PATCH TOPICAL DAILY
Status: DISCONTINUED | OUTPATIENT
Start: 2023-06-08 | End: 2023-06-08 | Stop reason: HOSPADM

## 2023-06-07 RX ORDER — ONDANSETRON 2 MG/ML
INJECTION INTRAMUSCULAR; INTRAVENOUS AS NEEDED
Status: DISCONTINUED | OUTPATIENT
Start: 2023-06-07 | End: 2023-06-07

## 2023-06-07 RX ORDER — OXYCODONE HYDROCHLORIDE 5 MG/1
5 TABLET ORAL EVERY 4 HOURS PRN
Status: DISCONTINUED | OUTPATIENT
Start: 2023-06-07 | End: 2023-06-07

## 2023-06-07 RX ORDER — BUPIVACAINE HYDROCHLORIDE AND EPINEPHRINE 2.5; 5 MG/ML; UG/ML
INJECTION, SOLUTION EPIDURAL; INFILTRATION; INTRACAUDAL; PERINEURAL AS NEEDED
Status: DISCONTINUED | OUTPATIENT
Start: 2023-06-07 | End: 2023-06-07 | Stop reason: HOSPADM

## 2023-06-07 RX ORDER — PROMETHAZINE HYDROCHLORIDE 25 MG/ML
12.5 INJECTION, SOLUTION INTRAMUSCULAR; INTRAVENOUS ONCE AS NEEDED
Status: DISCONTINUED | OUTPATIENT
Start: 2023-06-07 | End: 2023-06-07 | Stop reason: HOSPADM

## 2023-06-07 RX ORDER — FENTANYL CITRATE/PF 50 MCG/ML
25 SYRINGE (ML) INJECTION
Status: DISCONTINUED | OUTPATIENT
Start: 2023-06-07 | End: 2023-06-07 | Stop reason: HOSPADM

## 2023-06-07 RX ORDER — DEXAMETHASONE SODIUM PHOSPHATE 10 MG/ML
INJECTION, SOLUTION INTRAMUSCULAR; INTRAVENOUS AS NEEDED
Status: DISCONTINUED | OUTPATIENT
Start: 2023-06-07 | End: 2023-06-07

## 2023-06-07 RX ORDER — ALBUTEROL SULFATE 2.5 MG/3ML
2.5 SOLUTION RESPIRATORY (INHALATION) ONCE AS NEEDED
Status: DISCONTINUED | OUTPATIENT
Start: 2023-06-07 | End: 2023-06-07 | Stop reason: HOSPADM

## 2023-06-07 RX ORDER — CEFAZOLIN SODIUM 2 G/50ML
2000 SOLUTION INTRAVENOUS
Status: DISCONTINUED | OUTPATIENT
Start: 2023-06-07 | End: 2023-06-07 | Stop reason: HOSPADM

## 2023-06-07 RX ORDER — LABETALOL HYDROCHLORIDE 5 MG/ML
10 INJECTION, SOLUTION INTRAVENOUS
Status: DISCONTINUED | OUTPATIENT
Start: 2023-06-07 | End: 2023-06-07 | Stop reason: HOSPADM

## 2023-06-07 RX ORDER — MORPHINE SULFATE 4 MG/ML
4 INJECTION, SOLUTION INTRAMUSCULAR; INTRAVENOUS ONCE
Status: COMPLETED | OUTPATIENT
Start: 2023-06-07 | End: 2023-06-07

## 2023-06-07 RX ORDER — HYDROMORPHONE HCL/PF 1 MG/ML
0.5 SYRINGE (ML) INJECTION
Status: DISCONTINUED | OUTPATIENT
Start: 2023-06-07 | End: 2023-06-07

## 2023-06-07 RX ORDER — METRONIDAZOLE 500 MG/100ML
500 INJECTION, SOLUTION INTRAVENOUS EVERY 8 HOURS
Status: COMPLETED | OUTPATIENT
Start: 2023-06-08 | End: 2023-06-08

## 2023-06-07 RX ORDER — SODIUM CHLORIDE, SODIUM LACTATE, POTASSIUM CHLORIDE, CALCIUM CHLORIDE 600; 310; 30; 20 MG/100ML; MG/100ML; MG/100ML; MG/100ML
125 INJECTION, SOLUTION INTRAVENOUS CONTINUOUS
Status: CANCELLED | OUTPATIENT
Start: 2023-06-07

## 2023-06-07 RX ORDER — HYDROMORPHONE HCL IN WATER/PF 6 MG/30 ML
0.2 PATIENT CONTROLLED ANALGESIA SYRINGE INTRAVENOUS
Status: DISCONTINUED | OUTPATIENT
Start: 2023-06-07 | End: 2023-06-07

## 2023-06-07 RX ORDER — HYDROMORPHONE HCL/PF 1 MG/ML
0.5 SYRINGE (ML) INJECTION
Status: DISCONTINUED | OUTPATIENT
Start: 2023-06-07 | End: 2023-06-07 | Stop reason: HOSPADM

## 2023-06-07 RX ORDER — ROCURONIUM BROMIDE 10 MG/ML
INJECTION, SOLUTION INTRAVENOUS AS NEEDED
Status: DISCONTINUED | OUTPATIENT
Start: 2023-06-07 | End: 2023-06-07

## 2023-06-07 RX ORDER — MAGNESIUM HYDROXIDE 1200 MG/15ML
LIQUID ORAL AS NEEDED
Status: DISCONTINUED | OUTPATIENT
Start: 2023-06-07 | End: 2023-06-07 | Stop reason: HOSPADM

## 2023-06-07 RX ORDER — METRONIDAZOLE 500 MG/100ML
500 INJECTION, SOLUTION INTRAVENOUS ONCE
Status: COMPLETED | OUTPATIENT
Start: 2023-06-07 | End: 2023-06-07

## 2023-06-07 RX ORDER — SODIUM CHLORIDE, SODIUM LACTATE, POTASSIUM CHLORIDE, CALCIUM CHLORIDE 600; 310; 30; 20 MG/100ML; MG/100ML; MG/100ML; MG/100ML
75 INJECTION, SOLUTION INTRAVENOUS CONTINUOUS
Status: DISCONTINUED | OUTPATIENT
Start: 2023-06-07 | End: 2023-06-08 | Stop reason: HOSPADM

## 2023-06-07 RX ORDER — PROPOFOL 10 MG/ML
INJECTION, EMULSION INTRAVENOUS AS NEEDED
Status: DISCONTINUED | OUTPATIENT
Start: 2023-06-07 | End: 2023-06-07

## 2023-06-07 RX ORDER — ONDANSETRON 2 MG/ML
4 INJECTION INTRAMUSCULAR; INTRAVENOUS EVERY 4 HOURS PRN
Status: DISCONTINUED | OUTPATIENT
Start: 2023-06-07 | End: 2023-06-08 | Stop reason: HOSPADM

## 2023-06-07 RX ORDER — MIDAZOLAM HYDROCHLORIDE 2 MG/2ML
INJECTION, SOLUTION INTRAMUSCULAR; INTRAVENOUS AS NEEDED
Status: DISCONTINUED | OUTPATIENT
Start: 2023-06-07 | End: 2023-06-07

## 2023-06-07 RX ORDER — CEFAZOLIN SODIUM 1 G/50ML
1000 SOLUTION INTRAVENOUS EVERY 8 HOURS
Status: DISCONTINUED | OUTPATIENT
Start: 2023-06-07 | End: 2023-06-07 | Stop reason: SDUPTHER

## 2023-06-07 RX ORDER — METOCLOPRAMIDE HYDROCHLORIDE 5 MG/ML
10 INJECTION INTRAMUSCULAR; INTRAVENOUS ONCE AS NEEDED
Status: DISCONTINUED | OUTPATIENT
Start: 2023-06-07 | End: 2023-06-07 | Stop reason: HOSPADM

## 2023-06-07 RX ORDER — ACETAMINOPHEN 325 MG/1
975 TABLET ORAL EVERY 6 HOURS
Status: DISCONTINUED | OUTPATIENT
Start: 2023-06-07 | End: 2023-06-08 | Stop reason: HOSPADM

## 2023-06-07 RX ORDER — HYDRALAZINE HYDROCHLORIDE 20 MG/ML
5 INJECTION INTRAMUSCULAR; INTRAVENOUS
Status: DISCONTINUED | OUTPATIENT
Start: 2023-06-07 | End: 2023-06-07 | Stop reason: HOSPADM

## 2023-06-07 RX ORDER — SODIUM CHLORIDE 9 MG/ML
INJECTION, SOLUTION INTRAVENOUS AS NEEDED
Status: DISCONTINUED | OUTPATIENT
Start: 2023-06-07 | End: 2023-06-07 | Stop reason: HOSPADM

## 2023-06-07 RX ORDER — HYDROMORPHONE HCL/PF 1 MG/ML
0.5 SYRINGE (ML) INJECTION
Status: DISCONTINUED | OUTPATIENT
Start: 2023-06-07 | End: 2023-06-08

## 2023-06-07 RX ORDER — HYDROMORPHONE HCL IN WATER/PF 6 MG/30 ML
0.2 PATIENT CONTROLLED ANALGESIA SYRINGE INTRAVENOUS
Status: DISCONTINUED | OUTPATIENT
Start: 2023-06-07 | End: 2023-06-07 | Stop reason: HOSPADM

## 2023-06-07 RX ORDER — ONDANSETRON 2 MG/ML
4 INJECTION INTRAMUSCULAR; INTRAVENOUS ONCE AS NEEDED
Status: DISCONTINUED | OUTPATIENT
Start: 2023-06-07 | End: 2023-06-07 | Stop reason: HOSPADM

## 2023-06-07 RX ORDER — LIDOCAINE HYDROCHLORIDE 10 MG/ML
INJECTION, SOLUTION EPIDURAL; INFILTRATION; INTRACAUDAL; PERINEURAL AS NEEDED
Status: DISCONTINUED | OUTPATIENT
Start: 2023-06-07 | End: 2023-06-07

## 2023-06-07 RX ORDER — FENTANYL CITRATE 50 UG/ML
INJECTION, SOLUTION INTRAMUSCULAR; INTRAVENOUS AS NEEDED
Status: DISCONTINUED | OUTPATIENT
Start: 2023-06-07 | End: 2023-06-07

## 2023-06-07 RX ORDER — OXYCODONE HYDROCHLORIDE 5 MG/1
5 TABLET ORAL EVERY 4 HOURS PRN
Status: DISCONTINUED | OUTPATIENT
Start: 2023-06-07 | End: 2023-06-08

## 2023-06-07 RX ADMIN — MIDAZOLAM HYDROCHLORIDE 2 MG: 1 INJECTION, SOLUTION INTRAMUSCULAR; INTRAVENOUS at 21:05

## 2023-06-07 RX ADMIN — ONDANSETRON 4 MG: 2 INJECTION INTRAMUSCULAR; INTRAVENOUS at 21:19

## 2023-06-07 RX ADMIN — CEFAZOLIN SODIUM 2000 MG: 2 SOLUTION INTRAVENOUS at 21:09

## 2023-06-07 RX ADMIN — METRONIDAZOLE: 5 INJECTION, SOLUTION INTRAVENOUS at 21:14

## 2023-06-07 RX ADMIN — LIDOCAINE HYDROCHLORIDE 50 MG: 10 INJECTION, SOLUTION EPIDURAL; INFILTRATION; INTRACAUDAL; PERINEURAL at 21:09

## 2023-06-07 RX ADMIN — ROCURONIUM BROMIDE 50 MG: 10 SOLUTION INTRAVENOUS at 21:09

## 2023-06-07 RX ADMIN — PROPOFOL 150 MG: 10 INJECTION, EMULSION INTRAVENOUS at 21:09

## 2023-06-07 RX ADMIN — SUGAMMADEX 200 MG: 100 INJECTION, SOLUTION INTRAVENOUS at 22:07

## 2023-06-07 RX ADMIN — IOHEXOL 85 ML: 350 INJECTION, SOLUTION INTRAVENOUS at 18:55

## 2023-06-07 RX ADMIN — FENTANYL CITRATE 100 MCG: 50 INJECTION, SOLUTION INTRAMUSCULAR; INTRAVENOUS at 21:09

## 2023-06-07 RX ADMIN — DEXAMETHASONE SODIUM PHOSPHATE 10 MG: 10 INJECTION, SOLUTION INTRAMUSCULAR; INTRAVENOUS at 21:19

## 2023-06-07 RX ADMIN — MORPHINE SULFATE 4 MG: 4 INJECTION INTRAVENOUS at 17:40

## 2023-06-07 RX ADMIN — SODIUM CHLORIDE, SODIUM LACTATE, POTASSIUM CHLORIDE, AND CALCIUM CHLORIDE: .6; .31; .03; .02 INJECTION, SOLUTION INTRAVENOUS at 21:05

## 2023-06-07 NOTE — LETTER
[facility] FLOOR MED SURG UNIT    [facility] [address]  Dept: [phone]    June 8, 2023     Patient: Eriberto Henderson   YOB: 1985   Date of Visit: 6/7/2023       To Whom it May Concern:    Bijan Kearns is under my professional care  He was seen in the hospital from 6/7/2023 to 06/08/23  He may return to work on 6/19/23 with the following limitations light duty (no heavy lifting <20 lbs) for 2 weeks   If you have any questions or concerns, please don't hesitate to call           Sincerely,          LUC Landers

## 2023-06-08 VITALS
HEART RATE: 88 BPM | DIASTOLIC BLOOD PRESSURE: 71 MMHG | OXYGEN SATURATION: 93 % | RESPIRATION RATE: 17 BRPM | TEMPERATURE: 97.7 F | SYSTOLIC BLOOD PRESSURE: 105 MMHG

## 2023-06-08 PROBLEM — K35.80 ACUTE APPENDICITIS: Status: RESOLVED | Noted: 2023-06-07 | Resolved: 2023-06-08

## 2023-06-08 PROCEDURE — NC001 PR NO CHARGE: Performed by: SURGERY

## 2023-06-08 RX ORDER — OXYCODONE HYDROCHLORIDE 5 MG/1
5 TABLET ORAL EVERY 4 HOURS PRN
Status: DISCONTINUED | OUTPATIENT
Start: 2023-06-08 | End: 2023-06-08 | Stop reason: HOSPADM

## 2023-06-08 RX ADMIN — SODIUM CHLORIDE, SODIUM LACTATE, POTASSIUM CHLORIDE, AND CALCIUM CHLORIDE 125 ML/HR: .6; .31; .03; .02 INJECTION, SOLUTION INTRAVENOUS at 00:59

## 2023-06-08 RX ADMIN — CEFAZOLIN SODIUM 1000 MG: 1 SOLUTION INTRAVENOUS at 05:13

## 2023-06-08 RX ADMIN — ACETAMINOPHEN 975 MG: 325 TABLET ORAL at 11:06

## 2023-06-08 RX ADMIN — ACETAMINOPHEN 975 MG: 325 TABLET ORAL at 05:15

## 2023-06-08 RX ADMIN — METRONIDAZOLE 500 MG: 500 INJECTION, SOLUTION INTRAVENOUS at 14:00

## 2023-06-08 RX ADMIN — CEFAZOLIN SODIUM 1000 MG: 1 SOLUTION INTRAVENOUS at 14:37

## 2023-06-08 RX ADMIN — ACETAMINOPHEN 975 MG: 325 TABLET ORAL at 00:58

## 2023-06-08 RX ADMIN — METRONIDAZOLE 500 MG: 500 INJECTION, SOLUTION INTRAVENOUS at 06:28

## 2023-06-08 NOTE — OP NOTE
OPERATIVE REPORT  PATIENT NAME: Luis Real    :  1985  MRN: [id number]  Pt Location: AN OR ROOM 01    SURGERY DATE: 2023    Surgeon(s) and Role:     * Toy Mancilla DO - Primary     * Vicente Boles MD - Assisting    Preop Diagnosis:  Acute appendicitis     Post-Op Diagnosis Codes:     * Acute appendicitis with perforation and generalized peritonitis, without abscess or gangrene [H51 04]\  Umbilical hernia    Procedure(s):  APPENDECTOMY LAPAROSCOPIC  Umbilical hernia repair, no mesh    Specimen(s):  ID Type Source Tests Collected by Time Destination   1 : CC PRIMARY CARE PHYSICIAN Tissue Appendix TISSUE EXAM Elicia Thomaslynne Bernal DO 2023        Estimated Blood Loss:   Minimal    Drains:  Urethral Catheter Non-latex 16 Fr  (Active)   Number of days: 0       Anesthesia Type:   General/local    Operative Indications:  Acute appendicitis    Operative Findings:  Focally perforated acute appendicitis  Perforation was contained by the mesoappendix  No gross contamination small umbilical hernia upon entrance  ASA 2  Wound class III  Height 68 inches weight 190 pounds / 86 kg BMI 29    Complications:   None    Procedure and Technique:  Patient was brought the operative suite and identified by visualization, conversation, by armband  Sequential compression pumps were placed  He received antibiotics perioperatively  Once under general anesthesia [place] catheter was placed under sterile technique  Abdomen is then prepped and draped in a sterile fashion  Timeout was performed was assured that the prep was dry  Local was instilled at the supraumbilical fold  Small vertical skin incision was made  Bluntly dissected down with [place] clamps towards the fascia  This was lifted up  Very small umbilical hernia was noted towards the stalk  This opening was carried more superiorly with a scalpel blade  12 mm trocar was placed under direct visualization    CO2 was attached [facility] a pneumoperitoneum 5 mm bladeless trocar was then placed in the suprapubic as well as right lower quadrant area  The appendix was somewhat adherent to the right pelvic sidewall  The lifted cephalad with the laparoscopic [place]is clamp  Mesoappendix was divided with variable speed the harmonic scalpel  As I got to a knuckle of the appendix and lifted this more superiorly small flash of stool was noted indicative of a contained perforation  This area was assured to be grasped  There was right near the base of the appendix at the junction of the cecum  Were able to lift this up and get a clean proximal base of the appendix right off the cecum noted  Appendix was then taken off the cecum with a small cuff of the cecum with 2 fires of a blue loaded laparoscopic Endo TOI stapler  Staple line was well intact  There was excellent hemostasis  Appendix was placed into an Endo Catch bag  Irrigation was carried out along the gutter as well as down the pelvis and next to the lateral aspects of the liver  Pneumoperitoneum was evacuated  Appendix had been placed in an Endo Catch bag was brought to the umbilical port site  Other trocars removed  Fascia umbilical port/hernia site was closed using 0 Vicryl in a figure-of-eight fashion  Local was instilled  4-0 Monocryl was used to close all skin incisions in a subicular fashion  Wound was washed and dried  Sterile skin glue was applied  He was awakened in the operating room and returned to the recovery area in stable condition having tolerated the procedure well  I was present for the entire procedure      Patient Disposition:  PACU           SIGNATURE: Lis Maria DO  DATE: June 7, 2023  TIME: 9:55 PM

## 2023-06-08 NOTE — PROGRESS NOTES
General Surgery  Progress Note   Payal Reyes 43 y o  male MRN: [id number]  Unit/Bed#: S -61 Encounter: [id number]    Assessment:  Kiki Ram is a 43 y o  male who presented with acute appendicitis now s/p laparoscopic appendectomy on  with Dr Mady Khan  Plan:  -Continue surgical soft diet  -IV fluids at 75 cc/hr  -Pain and nausea control as needed  -Will receive 1 more dose of Ancef and Flagyl around 1330  -Discharge home after antibiotics are completed    Subjective: Patient seen and examined at bedside, in no acute distress  No acute events overnight  Patient's pain is well controlled  Denies nausea or vomiting  Not yet passing gas or stool  Objective:     Vitals:Blood pressure 130/81, pulse 94, temperature 98 °F (36 7 °C), resp  rate 19, SpO2 93 %  ,There is no height or weight on file to calculate BMI       Temp (24hrs), Av 2 °F (36 8 °C), Min:97 6 °F (36 4 °C), Max:99 2 °F (37 3 °C)  Current: Temperature: 98 °F (36 7 °C)      Intake/Output Summary (Last 24 hours) at 2023 0827  Last data filed at 2023 2212  Gross per 24 hour   Intake 1000 ml   Output 165 ml   Net 835 ml       Invasive Devices     Peripheral Intravenous Line  Duration           Peripheral IV 23 Left Antecubital <1 day                Physical Exam:  Gen: Lying comfortably in bed, no acute distress  CV: Regular rate and rhythm  Pulm: Breathing comfortably on room air, no respiratory distress  Abd: Soft, minimally distended, appropriately tender to palpation, no rebound or guarding, incisions are clean dry and intact with skin glue  Ext: Warm and dry, no edema  Neuro: Alert and oriented    Lab Results: Results: I have personally reviewed all pertinent laboratory/tests results

## 2023-06-08 NOTE — ANESTHESIA PREPROCEDURE EVALUATION
Procedure:  APPENDECTOMY LAPAROSCOPIC (Abdomen)    Relevant Problems   GI/HEPATIC   (+) Gastroesophageal reflux disease      PULMONARY   (+) Asthma      Digestive   (+) Acute appendicitis        Physical Exam    Airway    Mallampati score: II  TM Distance: >3 FB  Neck ROM: full     Dental       Cardiovascular  Rhythm: regular, Rate: normal, Cardiovascular exam normal    Pulmonary  Pulmonary exam normal Breath sounds clear to auscultation,     Other Findings        Anesthesia Plan  ASA Score- 2 Emergent    Anesthesia Type- general with ASA Monitors  Additional Monitors:   Airway Plan: ETT  Plan Factors-Exercise tolerance (METS): >4 METS  Chart reviewed  Existing labs reviewed  Patient summary reviewed  Patient is not a current smoker  Induction- intravenous and rapid sequence induction  Postoperative Plan- Plan for postoperative opioid use  Planned trial extubation    Informed Consent- Anesthetic plan and risks discussed with patient  I personally reviewed this patient with the CRNA  Discussed and agreed on the Anesthesia Plan with the CRNA  Jim Sherman

## 2023-06-08 NOTE — DISCHARGE INSTR - AVS FIRST PAGE
Laparoscopic Appendectomy      WHAT YOU SHOULD KNOW:    Laparoscopic appendectomy is surgery to remove your appendix  During this surgery, small incisions are made in your abdomen  A small scope and special tools are inserted through these incisions  Your appendix is removed from you bowels and taken out of your abdomen  The incisions are closed with sutures and a small amount of glue is applied over top incisions to help reinforce the incisions to optimize healing  AFTER YOU LEAVE: Following discharge from the hospital, you may have some questions about your procedure, your activities or your general condition  These instructions may answer some of your questions and help you adjust during the first few days following your operation  You can expect to be sore and tender mostly around the incisions  This pain should last approximally 5 days and gradually improve daily  Incisions: Your doctor may have chosen to use a type of adhesive glue, to close your incision  The glue is used to cover the incision, assist in closure, and prevent contamination so to optimize healing  This adhesive glue will darken and may appear as a purple film over the incision  Do not pick at the glue, it will peel away on its own within one to two weeks  You may apply ice to the incisions to help with pain  Avoid heat as this my make the glue tacky   It is normal to have some bruising, swelling or mild discoloration around the incision  If increasing redness or pain develops, call our office immediately  You may wash the incision gently with soap and water then pat dry  Do not apply any creams or ointments  Dressings: You do not usually need to keep incisions covered with a dressing  A dressing is only required if you had a drain following your surgery and the drain was removed prior to discharge  If a dressing is required the doctor will discuss the dressing with prior to leaving   You may remove the dressing for showering, but reapply when dry  Bathing: You may shower daily with soap and water the day after the procedure  It is OK to GENTLY wash the incision with soap and water then pat dry  Do NOT soak incision in a tub, pool, or hot tub for 2 weeks  Diet: Resume your normal diet unless specified otherwise  We recommend you slowly advance your diet  Try to start with softer bland foods and gradually advance as tolerated  Be sure to consume plenty of water  Avoid alcohol  Activity/Restrictions: The evening following the procedure you should rest as much as possible, sitting, lying or reclining  you should be sure someone remains with you until the next morning  Gradually increase your activity daily  Walking 3-4 daily is good and  stairs are ok  Listen to your body  If you start to get tired or sore then rest    No strenuous activity or exercise for 3-4 weeks  No driving for 5 days or while taking narcotics for pain  Return or work: You may return to work or other activities as soon as your pain is controlled and you feel comfortable  For many people, this is 5 to 7 days after surgery  If your job requires heavy lifting you will need to be on light duty for 2-3 weeks  Follow up appointment: following discharge from the hospital call the office in 1-2 days to set up a post operative appointment to be seen in 2-3 weeks  The phone number and address should be provided in your discharge paper work  Medication: Please take all medications as prescribed  Call your healthcare provider if you think your medicine is not helping or if you have side effects  If you were given a prescription for Percocet, Norco, or Vicodin for pain be sure to eat prior to taking as these medications as they may cause nausea and vomiting on an empty stomach  DO NOT take Tylenol with these medication for a fever or for further pain control as these medications already contain Tylenol in them  Contact your healthcare provider if:   You have a fever over 101°F (38°C) or chills  You have pain or nausea that is not relieved by medicine  You have redness and swelling around your incisions, or blood or pus is leaking             from your incisions  You are constipated or have diarrhea  Your skin or eyes are yellow, or your bowel movements are pale  You have questions or concerns about your surgery, condition, or care  Seek care immediately or call 911 if:   You cannot stop vomiting  Your bowel movements are black or bloody  You have pain in your abdomen and it is swollen or hard  Your arm or leg feels warm, tender, and painful  It may look swollen and red  You feel lightheaded, short of breath, and have chest pain  You cough up blood

## 2023-06-08 NOTE — H&P
H&P Exam - General Surgery   Kacy Reyes 43 y o  male MRN: [id number]  Unit/Bed#: ED-02 Encounter: [id number]    Assessment/Plan     Assessment:  42 yo male with acute appendicitis  Exam and CT scan concordant  No evidence of abscess on scan  Plan:  -OR for laparoscopic appendectomy  -Ancef/Flagyl  -Pain and nausea control as needed  -Admit as outpatient no charge bed    History of Present Illness     HPI:  Rayshawn Morocho is a 43 y o  male who presents with 1 day of abdominal pain localized to the right lower quadrant  No prior history of pain like this  No prior abdominal surgeries  Family history of colon cancer in his brother  No prior colonoscopy  Notes a loss of appetite  No fevers or chills, no nausea, no vomiting, no chest pain or shortness of breath  Review of Systems   Constitutional: Positive for appetite change  Negative for chills and fever  Respiratory: Negative for shortness of breath  Cardiovascular: Negative for chest pain  Gastrointestinal: Positive for abdominal pain  Negative for abdominal distention, blood in stool, constipation, diarrhea, nausea and vomiting  Genitourinary: Negative for difficulty urinating  Skin: Negative for color change  Neurological: Negative for dizziness, speech difficulty and light-headedness  All other systems reviewed and are negative  Historical Information   Past Medical History:   Diagnosis Date   • Migraine      History reviewed  No pertinent surgical history    Social History   Social History     Substance and Sexual Activity   Alcohol Use No     Social History     Substance and Sexual Activity   Drug Use No     Social History     Tobacco Use   Smoking Status Never   Smokeless Tobacco Never     E-Cigarette/Vaping     E-Cigarette/Vaping Substances     Family History: brother - colon cancer    Meds/Allergies   all medications and allergies reviewed  Allergies   Allergen Reactions   • Shellfish-Derived Products - Food Allergy Swelling       Objective   First Vitals:   Blood Pressure: 128/85 (06/07/23 1700)  Pulse: 96 (06/07/23 1700)  Temperature: 99 °F (37 2 °C) (06/07/23 1700)  Temp Source: Oral (06/07/23 1700)  Respirations: 18 (06/07/23 1700)  SpO2: 98 % (06/07/23 1700)    Current Vitals:   Blood Pressure: 128/85 (06/07/23 1700)  Pulse: 96 (06/07/23 1700)  Temperature: 99 °F (37 2 °C) (06/07/23 1700)  Temp Source: Oral (06/07/23 1700)  Respirations: 18 (06/07/23 1700)  SpO2: 98 % (06/07/23 1700)    No intake or output data in the 24 hours ending 06/07/23 2007    Invasive Devices     Peripheral Intravenous Line  Duration           Peripheral IV 06/07/23 Left Antecubital <1 day                Physical Exam  Vitals reviewed  Constitutional:       General: He is not in acute distress  Appearance: He is normal weight  He is ill-appearing  Cardiovascular:      Rate and Rhythm: Normal rate and regular rhythm  Pulmonary:      Effort: Pulmonary effort is normal  No respiratory distress  Abdominal:      Comments: Soft, nondistended, focal tenderness in right lower quadrant, mild rebound tenderness, voluntary guarding which relaxes with distraction   Skin:     General: Skin is warm and dry  Coloration: Skin is not jaundiced or pale  Neurological:      Mental Status: He is alert and oriented to person, place, and time  Lab Results:   I have personally reviewed pertinent lab results    , CBC:   Lab Results   Component Value Date    HCT 41 4 06/07/2023    HGB 13 6 06/07/2023    MCH 28 7 06/07/2023    MCHC 32 9 06/07/2023    MCV 87 06/07/2023    MPV 8 9 06/07/2023    NRBC 0 06/07/2023     06/07/2023    RBC 4 74 06/07/2023    RDW 12 8 06/07/2023    WBC 12 27 (H) 06/07/2023   , CMP:   Lab Results   Component Value Date    ALKPHOS 64 06/07/2023    ALT 38 06/07/2023    AST 21 06/07/2023    BUN 10 06/07/2023    CALCIUM 9 2 06/07/2023     06/07/2023    CO2 27 06/07/2023    CREATININE 0 86 06/07/2023    EGFR 109 06/07/2023    K 3 6 06/07/2023    SODIUM 136 06/07/2023     Imaging: I have personally reviewed pertinent reports  and I have personally reviewed pertinent films in PACS  EKG, Pathology, and Other Studies: I have personally reviewed pertinent reports        Code Status: No Order  Advance Directive and Living Will:      Power of :    POLST:

## 2023-06-08 NOTE — PLAN OF CARE
Problem: PAIN - ADULT  Goal: Verbalizes/displays adequate comfort level or baseline comfort level  Description: Interventions:  - Encourage patient to monitor pain and request assistance  - Assess pain using appropriate pain scale  - Administer analgesics based on type and severity of pain and evaluate response  - Implement non-pharmacological measures as appropriate and evaluate response  - Consider cultural and social influences on pain and pain management  - Notify physician/advanced practitioner if interventions unsuccessful or patient reports new pain  Outcome: Progressing     Problem: INFECTION - ADULT  Goal: Absence or prevention of progression during hospitalization  Description: INTERVENTIONS:  - Assess and monitor for signs and symptoms of infection  - Monitor lab/diagnostic results  - Monitor all insertion sites, i e  indwelling lines, tubes, and drains  - Monitor endotracheal if appropriate and nasal secretions for changes in amount and color  - [place] appropriate cooling/warming therapies per order  - Administer medications as ordered  - Instruct and encourage patient and family to use good hand hygiene technique  - Identify and instruct in appropriate isolation precautions for identified infection/condition  Outcome: Progressing  Goal: Absence of fever/infection during neutropenic period  Description: INTERVENTIONS:  - Monitor WBC    Outcome: Progressing     Problem: DISCHARGE PLANNING  Goal: Discharge to home or other facility with appropriate resources  Description: INTERVENTIONS:  - Identify barriers to discharge w/patient and caregiver  - Arrange for needed discharge resources and transportation as appropriate  - Identify discharge learning needs (meds, wound care, etc )  - Arrange for interpretive services to assist at discharge as needed  - Refer to Case Management Department for coordinating discharge planning if the patient needs post-hospital services based on physician/advanced practitioner order or complex needs related to functional status, cognitive ability, or social support system  Outcome: Progressing     Problem: Knowledge Deficit  Goal: Patient/family/caregiver demonstrates understanding of disease process, treatment plan, medications, and discharge instructions  Description: Complete learning assessment and assess knowledge base    Interventions:  - Provide teaching at level of understanding  - Provide teaching via preferred learning methods  Outcome: Progressing

## 2023-06-08 NOTE — ANESTHESIA POSTPROCEDURE EVALUATION
Post-Op Assessment Note    CV Status:  Stable  Pain Score: 0    Pain management: adequate     Mental Status:  Alert and awake   Hydration Status:  Euvolemic   PONV Controlled:  Controlled   Airway Patency:  Patent      Post Op Vitals Reviewed: Yes      Staff: CRNA         No notable events documented      BP   119/68   Temp   98 9   Pulse  84   Resp   16   SpO2   97%

## 2023-06-09 NOTE — ED PROVIDER NOTES
History  Chief Complaint   Patient presents with   • Abdominal Pain     Pt reports RLQ pain, hx of diverticulitis, states it feels the same     62-year-old male presents today with concerns of right lower quadrant abdominal pain  Patient states he does have a history of diverticulitis and this feels somewhat similar  Denies any nausea, vomiting, diarrhea, chills, fever, diarrhea, constipation  States that he was on antibiotics for his diverticulitis and it seemed to go away within several days however this was about 6 months ago when he states that he has had a recurrence of the pain  Denies any surgical history  No sick contacts  Prior to Admission Medications   Prescriptions Last Dose Informant Patient Reported? Taking?   ondansetron (ZOFRAN-ODT) 4 mg disintegrating tablet   No No   Sig: Take 1 tablet (4 mg total) by mouth every 8 (eight) hours as needed for nausea or vomiting for up to 7 days      Facility-Administered Medications: None       Past Medical History:   Diagnosis Date   • Migraine        History reviewed  No pertinent surgical history  History reviewed  No pertinent family history  I have reviewed and agree with the history as documented  E-Cigarette/Vaping     E-Cigarette/Vaping Substances     Social History     Tobacco Use   • Smoking status: Never   • Smokeless tobacco: Never   Substance Use Topics   • Alcohol use: No   • Drug use: No       Review of Systems   Constitutional: Negative for chills and fever  HENT: Negative for ear pain and sore throat  Eyes: Negative for pain and visual disturbance  Respiratory: Negative for apnea, cough, choking, chest tightness, shortness of breath, wheezing and stridor  Cardiovascular: Negative for chest pain, palpitations and leg swelling  Gastrointestinal: Positive for abdominal pain  Negative for abdominal distention, anal bleeding, blood in stool, constipation, diarrhea, nausea, rectal pain and vomiting     Genitourinary: Negative for dysuria and hematuria  Musculoskeletal: Negative for arthralgias and back pain  Skin: Negative for color change and rash  Neurological: Negative for seizures and syncope  All other systems reviewed and are negative  Physical Exam  Physical Exam  Vitals and nursing note reviewed  Constitutional:       General: He is not in acute distress  Appearance: He is well-developed  HENT:      Head: Normocephalic and atraumatic  Eyes:      Conjunctiva/sclera: Conjunctivae normal    Cardiovascular:      Rate and Rhythm: Normal rate and regular rhythm  Heart sounds: No murmur heard  Pulmonary:      Effort: Pulmonary effort is normal  No respiratory distress  Breath sounds: Normal breath sounds  Abdominal:      General: Bowel sounds are normal       Palpations: Abdomen is soft  Tenderness: There is abdominal tenderness in the right lower quadrant, periumbilical area and suprapubic area  There is no right CVA tenderness, left CVA tenderness, guarding or rebound  Positive signs include Rovsing's sign and McBurney's sign  Negative signs include Kumar's sign and psoas sign  Hernia: No hernia is present  Musculoskeletal:         General: No swelling  Cervical back: Neck supple  Skin:     General: Skin is warm and dry  Capillary Refill: Capillary refill takes less than 2 seconds  Neurological:      Mental Status: He is alert     Psychiatric:         Mood and Affect: Mood normal          Vital Signs  ED Triage Vitals [06/07/23 1700]   Temperature Pulse Respirations Blood Pressure SpO2   99 °F (37 2 °C) 96 18 128/85 98 %      Temp Source Heart Rate Source Patient Position - Orthostatic VS BP Location FiO2 (%)   Oral Monitor Sitting Right arm --      Pain Score       7           Vitals:    06/08/23 0240 06/08/23 0352 06/08/23 0726 06/08/23 1524   BP: 133/84 128/85 130/81 105/71   Pulse: 104 103 94 88   Patient Position - Orthostatic VS:             Visual Acuity      ED Medications  Medications   morphine injection 4 mg (4 mg Intravenous Given 6/7/23 1740)   iohexol (OMNIPAQUE) 350 MG/ML injection (SINGLE-DOSE) 85 mL (85 mL Intravenous Given 6/7/23 1855)   metroNIDAZOLE (FLAGYL) IVPB (premix) 500 mg 100 mL ( Intravenous Stopped 6/7/23 2129)   metroNIDAZOLE (FLAGYL) IVPB (premix) 500 mg 100 mL (500 mg Intravenous New Bag 6/8/23 1400)   ceFAZolin (ANCEF) IVPB (premix in dextrose) 1,000 mg 50 mL (1,000 mg Intravenous New Bag 6/8/23 1437)       Diagnostic Studies  Results Reviewed     Procedure Component Value Units Date/Time    UA w Reflex to Microscopic w Reflex to Culture [[id number]] Collected: 06/07/23 1827    Lab Status: Final result Specimen: Urine, Clean Catch Updated: 06/07/23 1843     Color, UA Light Yellow     Clarity, UA Turbid     Specific [place], UA 1 020     pH, UA 6 5     Leukocytes, UA Negative     Nitrite, UA Negative     Protein, UA Negative mg/dl      Glucose, UA Negative mg/dl      Ketones, UA Negative mg/dl      Urobilinogen, UA <2 0 mg/dl      Bilirubin, UA Negative     Occult Blood, UA Negative    Comprehensive metabolic panel [[id number]]  (Abnormal) Collected: 06/07/23 1738    Lab Status: Final result Specimen: Blood from Arm, Left Updated: 06/07/23 1807     Sodium 136 mmol/L      Potassium 3 6 mmol/L      Chloride 100 mmol/L      CO2 27 mmol/L      ANION GAP 9 mmol/L      BUN 10 mg/dL      Creatinine 0 86 mg/dL      Glucose 88 mg/dL      Calcium 9 2 mg/dL      AST 21 U/L      ALT 38 U/L      Alkaline Phosphatase 64 U/L      Total Protein 7 4 g/dL      Albumin 4 2 g/dL      Total Bilirubin 1 01 mg/dL      eGFR 109 ml/min/1 73sq m     Narrative:      [place] guidelines for Chronic Kidney Disease (CKD):   •  Stage 1 with normal or high GFR (GFR > 90 mL/min/1 73 square meters)  •  Stage 2 Mild CKD (GFR = 60-89 mL/min/1 73 square meters)  •  Stage 3A Moderate CKD (GFR = 45-59 mL/min/1 73 square meters)  •  Stage 3B Moderate CKD (GFR = 30-44 mL/min/1 73 square meters)  •  Stage 4 Severe CKD (GFR = 15-29 mL/min/1 73 square meters)  •  Stage 5 End Stage CKD (GFR <15 mL/min/1 73 square meters)  Note: GFR calculation is accurate only with a steady state creatinine    Lipase [[id number]]  (Abnormal) Collected: 06/07/23 1738    Lab Status: Final result Specimen: Blood from Arm, Left Updated: 06/07/23 1807     Lipase 7 u/L     CBC and differential [[id number]]  (Abnormal) Collected: 06/07/23 1738    Lab Status: Final result Specimen: Blood from Arm, Left Updated: 06/07/23 1752     WBC 12 27 Thousand/uL      RBC 4 74 Million/uL      Hemoglobin 13 6 g/dL      Hematocrit 41 4 %      MCV 87 fL      MCH 28 7 pg      MCHC 32 9 g/dL      RDW 12 8 %      MPV 8 9 fL      Platelets 486 Thousands/uL      nRBC 0 /100 WBCs      Neutrophils Relative 78 %      Immat GRANS % 1 %      Lymphocytes Relative 15 %      Monocytes Relative 6 %      Eosinophils Relative 0 %      Basophils Relative 0 %      Neutrophils Absolute 9 60 Thousands/µL      Immature Grans Absolute 0 06 Thousand/uL      Lymphocytes Absolute 1 83 Thousands/µL      Monocytes Absolute 0 73 Thousand/µL      Eosinophils Absolute 0 02 Thousand/µL      Basophils Absolute 0 03 Thousands/µL                  CT abdomen pelvis with contrast   Final Result by Nikunj Fatima MD (06/07 1937)      Findings compatible with acute appendicitis as described above  I personally discussed this study with Kaylin Zhong on 6/7/2023 7:37 PM                   Workstation performed: [id number]                    Procedures  Procedures         ED Course  ED Course as of 06/08/23 2045 Wed Jun 07, 2023   1759 WBC(!): 12 27   1822 TOTAL BILIRUBIN(!): 1 01  Elevated from previous                                             Medical Decision Making  48-year-old male presenting today with right lower quadrant abdominal pain  Abdominal labs for SARS, lipase, CBC, CMP  CBC showing white blood cell count of 12 27  Morphine for pain  Patient states much improvement of his pain with this regimen  CT abdomen pelvis with contrast for further evaluation  Appendicitis found without any perforation  Surgical team was contacted and evaluated patient  Patient will be transported to the OR for surgical resection of appendix  Patient at time of transfer to the OR was stable  Amount and/or Complexity of Data Reviewed  Labs: ordered  Decision-making details documented in ED Course  Radiology: ordered  Risk  Prescription drug management  Decision regarding hospitalization            Disposition  Final diagnoses:   Acute appendicitis     Time reflects when diagnosis was documented in both MDM as applicable and the Disposition within this note     Time User Action Codes Description Comment    6/7/2023  8:12 PM Dennie Many T Add [K35 20] Acute appendicitis with perforation and generalized peritonitis, without abscess or gangrene     6/7/2023  8:23 PM Kirit Joaquin Add [K35 80] Acute appendicitis     6/7/2023  8:23 PM Michaelene Emani Modify [K35 20] Acute appendicitis with perforation and generalized peritonitis, without abscess or gangrene     6/7/2023  8:23 PM Michaelene Emani Modify [K35 80] Acute appendicitis     6/7/2023  9:30 PM Kingsley Alvarenga Guardian Modify [K35 20] Acute appendicitis with perforation and generalized peritonitis, without abscess or gangrene       ED Disposition     ED Disposition   Send to OR    Condition   --    Date/Time   Wed Jun 7, 2023  8:23 PM    Comment   Appendectomy with [id number] surgery team          Follow-up Information     Follow up With Specialties Details Why Contact Melany Dahl DO General Surgery Schedule an appointment as soon as possible for a visit in 2 week(s)  [address] [place] [address]  [phone]            Discharge Medication List as of 6/8/2023  5:32 PM      START taking these medications    Details   oxyCODONE ([place] Distance) 5 immediate release tablet Take 1 tablet (5 mg total) by mouth every 4 (four) hours as needed for moderate pain for up to 10 days Max Daily Amount: 30 mg, Starting Wed 6/7/2023, Until Sat 6/17/2023 at 2359, Normal         CONTINUE these medications which have NOT CHANGED    Details   ondansetron (ZOFRAN-ODT) 4 mg disintegrating tablet Take 1 tablet (4 mg total) by mouth every 8 (eight) hours as needed for nausea or vomiting for up to 7 days, Starting Wed 11/16/2022, Until Wed 11/23/2022 at 2359, Normal             Outpatient Discharge Orders   Discharge Diet     Activity as tolerated     No strenuous exercise     No driving until     Ice to affected area     Shower on day dressing removed (No bath)     Call provider for:  persistent nausea or vomiting     Call provider for:  severe uncontrolled pain     Call provider for:  redness, tenderness, or signs of infection (pain, swelling, redness, odor or green/yellow discharge around incision site)     No dressing needed     Follow-up with surgeon in 1-2 weeks       PDMP Review       Value Time User    PDMP Reviewed  Yes 6/8/2023  4:57 PM Geraldine Edwards PA-C          ED Provider  Electronically Signed by           Parveen Hook PA-C  06/08/23 2046

## 2023-06-13 PROCEDURE — 88304 TISSUE EXAM BY PATHOLOGIST: CPT | Performed by: PATHOLOGY

## 2023-06-22 ENCOUNTER — OFFICE VISIT (OUTPATIENT)
Dept: SURGERY | Facility: CLINIC | Age: 38
End: 2023-06-22

## 2023-06-22 DIAGNOSIS — K35.32 ACUTE PERFORATED APPENDICITIS: Primary | ICD-10-CM

## 2023-06-22 PROCEDURE — 99024 POSTOP FOLLOW-UP VISIT: CPT | Performed by: SURGERY

## 2023-06-22 NOTE — PROGRESS NOTES
Assessment/Plan:    Diagnoses and all orders for this visit:    Acute perforated appendicitis    Status post laparoscopic appendectomy  Doing well    May resume work full duty as of July 30  Pathology: Reviewed with patient, all questions answered  Postoperative restrictions reviewed  All questions answered  ______________________________________________________  HPI: Patient presents post operatively  Laparoscopic appendectomy 6/7/2023   Final Diagnosis  A  Appendix, Laparoscopic Appendectomy:  - Acute, gangrenous appendicitis with perforation  Complains of some fatigue and soreness at his trocar sites  No nausea or vomiting             ROS:  General ROS: negative for - chills, fatigue, fever or night sweats, weight loss  Respiratory ROS: no cough, shortness of breath, or wheezing  Cardiovascular ROS: no chest pain or dyspnea on exertion  Genito-Urinary ROS: no dysuria, trouble voiding, or hematuria  Musculoskeletal ROS: negative for - gait disturbance, joint pain or muscle pain  Neurological ROS: no TIA or stroke symptoms  GI ROS: see HPI  Skin ROS: no new rashes or lesions   Lymphatic ROS: no new adenopathy noted by pt     GYN ROS: see HPI, no new GYN history or bleeding noted  Psy ROS: no new mental or behavioral disturbances         Patient Active Problem List   Diagnosis   • Gastroesophageal reflux disease   • Asthma       Allergies:  Shellfish-derived products - food allergy      Current Outpatient Medications:   •  ondansetron (ZOFRAN-ODT) 4 mg disintegrating tablet, Take 1 tablet (4 mg total) by mouth every 8 (eight) hours as needed for nausea or vomiting for up to 7 days, Disp: 20 tablet, Rfl: 0    Past Medical History:   Diagnosis Date   • Migraine        Past Surgical History:   Procedure Laterality Date   • APPENDECTOMY LAPAROSCOPIC N/A 6/7/2023    Procedure: APPENDECTOMY LAPAROSCOPIC;  Surgeon: Jade Bernal DO;  Location: [facility];  Service: General       No family history on file  reports that he has never smoked  He has never used smokeless tobacco  He reports that he does not drink alcohol and does not use drugs  PHYSICAL EXAM    There were no vitals taken for this visit  General: normal, cooperative, no distress  Abdominal: soft, nondistended or nontender  Incision: clean, dry, and intact and healing well  Healing ridge noted to each of his trocar sites        Helder Bernal DO    Date: 6/22/2023 Time: 3:29 PM

## 2025-02-26 ENCOUNTER — APPOINTMENT (EMERGENCY)
Dept: RADIOLOGY | Facility: HOSPITAL | Age: 40
End: 2025-02-26

## 2025-02-26 ENCOUNTER — HOSPITAL ENCOUNTER (EMERGENCY)
Facility: HOSPITAL | Age: 40
Discharge: HOME | End: 2025-02-26
Attending: EMERGENCY MEDICINE

## 2025-02-26 ENCOUNTER — APPOINTMENT (EMERGENCY)
Dept: CT IMAGING | Facility: HOSPITAL | Age: 40
End: 2025-02-26

## 2025-02-26 VITALS
HEART RATE: 71 BPM | DIASTOLIC BLOOD PRESSURE: 72 MMHG | OXYGEN SATURATION: 95 % | RESPIRATION RATE: 20 BRPM | SYSTOLIC BLOOD PRESSURE: 116 MMHG | TEMPERATURE: 98.9 F

## 2025-02-26 DIAGNOSIS — S09.90XA MINOR HEAD INJURY, INITIAL ENCOUNTER: ICD-10-CM

## 2025-02-26 DIAGNOSIS — R55 SYNCOPE: Primary | ICD-10-CM

## 2025-02-26 LAB
ALBUMIN SERPL BCG-MCNC: 4.6 G/DL (ref 3.5–5)
ALP SERPL-CCNC: 63 U/L (ref 34–104)
ALT SERPL W P-5'-P-CCNC: 28 U/L (ref 7–52)
ANION GAP SERPL CALCULATED.3IONS-SCNC: 9 MMOL/L (ref 4–13)
AST SERPL W P-5'-P-CCNC: 21 U/L (ref 13–39)
BASOPHILS # BLD AUTO: 0.01 THOUSANDS/ÂΜL (ref 0–0.1)
BASOPHILS NFR BLD AUTO: 0 % (ref 0–1)
BILIRUB SERPL-MCNC: 0.58 MG/DL (ref 0.2–1)
BUN SERPL-MCNC: 10 MG/DL (ref 5–25)
CALCIUM SERPL-MCNC: 9.1 MG/DL (ref 8.4–10.2)
CARDIAC TROPONIN I PNL SERPL HS: <2 NG/L (ref ?–50)
CHLORIDE SERPL-SCNC: 100 MMOL/L (ref 96–108)
CO2 SERPL-SCNC: 27 MMOL/L (ref 21–32)
CREAT SERPL-MCNC: 1.03 MG/DL (ref 0.6–1.3)
EOSINOPHIL # BLD AUTO: 0 THOUSAND/ÂΜL (ref 0–0.61)
EOSINOPHIL NFR BLD AUTO: 0 % (ref 0–6)
ERYTHROCYTE [DISTWIDTH] IN BLOOD BY AUTOMATED COUNT: 13.1 % (ref 11.6–15.1)
GFR SERPL CREATININE-BSD FRML MDRD: 90 ML/MIN/1.73SQ M
GLUCOSE SERPL-MCNC: 102 MG/DL (ref 65–140)
GLUCOSE SERPL-MCNC: 95 MG/DL (ref 65–140)
HCT VFR BLD AUTO: 45.6 % (ref 36.5–49.3)
HGB BLD-MCNC: 15 G/DL (ref 12–17)
IMM GRANULOCYTES # BLD AUTO: 0.03 THOUSAND/UL (ref 0–0.2)
IMM GRANULOCYTES NFR BLD AUTO: 1 % (ref 0–2)
LYMPHOCYTES # BLD AUTO: 1.54 THOUSANDS/ÂΜL (ref 0.6–4.47)
LYMPHOCYTES NFR BLD AUTO: 25 % (ref 14–44)
MCH RBC QN AUTO: 28.5 PG (ref 26.8–34.3)
MCHC RBC AUTO-ENTMCNC: 32.9 G/DL (ref 31.4–37.4)
MCV RBC AUTO: 87 FL (ref 82–98)
MONOCYTES # BLD AUTO: 0.54 THOUSAND/ÂΜL (ref 0.17–1.22)
MONOCYTES NFR BLD AUTO: 9 % (ref 4–12)
NEUTROPHILS # BLD AUTO: 4.09 THOUSANDS/ÂΜL (ref 1.85–7.62)
NEUTS SEG NFR BLD AUTO: 65 % (ref 43–75)
NRBC BLD AUTO-RTO: 0 /100 WBCS
PLATELET # BLD AUTO: 207 THOUSANDS/UL (ref 149–390)
PMV BLD AUTO: 9.3 FL (ref 8.9–12.7)
POTASSIUM SERPL-SCNC: 3.7 MMOL/L (ref 3.5–5.3)
PROT SERPL-MCNC: 8 G/DL (ref 6.4–8.4)
RBC # BLD AUTO: 5.27 MILLION/UL (ref 3.88–5.62)
SODIUM SERPL-SCNC: 136 MMOL/L (ref 135–147)
WBC # BLD AUTO: 6.21 THOUSAND/UL (ref 4.31–10.16)

## 2025-02-26 PROCEDURE — 80053 COMPREHEN METABOLIC PANEL: CPT

## 2025-02-26 PROCEDURE — 93005 ELECTROCARDIOGRAM TRACING: CPT

## 2025-02-26 PROCEDURE — 72125 CT NECK SPINE W/O DYE: CPT

## 2025-02-26 PROCEDURE — 82948 REAGENT STRIP/BLOOD GLUCOSE: CPT

## 2025-02-26 PROCEDURE — 36415 COLL VENOUS BLD VENIPUNCTURE: CPT

## 2025-02-26 PROCEDURE — 96360 HYDRATION IV INFUSION INIT: CPT

## 2025-02-26 PROCEDURE — 96361 HYDRATE IV INFUSION ADD-ON: CPT

## 2025-02-26 PROCEDURE — 85025 COMPLETE CBC W/AUTO DIFF WBC: CPT

## 2025-02-26 PROCEDURE — 70450 CT HEAD/BRAIN W/O DYE: CPT

## 2025-02-26 PROCEDURE — 84484 ASSAY OF TROPONIN QUANT: CPT

## 2025-02-26 PROCEDURE — 99285 EMERGENCY DEPT VISIT HI MDM: CPT | Performed by: EMERGENCY MEDICINE

## 2025-02-26 PROCEDURE — 99285 EMERGENCY DEPT VISIT HI MDM: CPT

## 2025-02-26 PROCEDURE — 71045 X-RAY EXAM CHEST 1 VIEW: CPT

## 2025-02-26 RX ORDER — ACETAMINOPHEN 325 MG/1
975 TABLET ORAL ONCE
Status: COMPLETED | OUTPATIENT
Start: 2025-02-26 | End: 2025-02-26

## 2025-02-26 RX ADMIN — SODIUM CHLORIDE 1000 ML: 0.9 INJECTION, SOLUTION INTRAVENOUS at 02:30

## 2025-02-26 RX ADMIN — ACETAMINOPHEN 975 MG: 325 TABLET, FILM COATED ORAL at 02:52

## 2025-02-26 NOTE — ED ATTENDING ATTESTATION
2/26/2025  INava MD, saw and evaluated the patient. I have discussed the patient with the resident/non-physician practitioner and agree with the resident's/non-physician practitioner's findings, Plan of Care, and MDM as documented in the resident's/non-physician practitioner's note, except where noted. All available labs and Radiology studies were reviewed.  I was present for key portions of any procedure(s) performed by the resident/non-physician practitioner and I was immediately available to provide assistance.       At this point I agree with the current assessment done in the Emergency Department.  I have conducted an independent evaluation of this patient a history and physical is as follows:    39-year-old male presents to the ER after syncopal episode.  Was having a bowel movement, stood up and passed out.  Recent URI symptoms and dehydration.  No chest pain or trouble breathing.  Hit his head and has a headache.  No neck pain.  Nonfocal neurologically.  No chest pain or trouble breathing.  Abdomen soft nontender.  Normal cardiopulmonary exam.  2+ pulses throughout.    This is likely vasovagal syncope in the setting of dehydration, agree with fluids, cardiac workup, check electrolytes and CBC, CT head and C-spine        ED Course         Critical Care Time  Procedures

## 2025-02-26 NOTE — ED PROVIDER NOTES
Time reflects when diagnosis was documented in both MDM as applicable and the Disposition within this note       Time User Action Codes Description Comment    2/26/2025  4:49 AM Gorge Oseguera [R55] Syncope     2/26/2025  4:50 AM Gorge Oseguera [S09.90XA] Minor head injury, initial encounter           ED Disposition       ED Disposition   Discharge    Condition   Stable    Date/Time   Wed Feb 26, 2025  4:50 AM    Comment   John Reyes discharge to home/self care.                   Assessment & Plan       Medical Decision Making  Patient is a 39-year-old male who presented to the ED for syncopal fall.  Patient stated that he has been sick with what he thinks is a viral URI with cough and congestion for the last few days and because of this was feeling more weak than usual. Patient stated that he went to the bathroom at around 1:30AM and had a bowel movement after which he stood up and the next thing he remembered was waking up on the floor. His significant other who accompanied him stated that she heard a thud at this point and saw that he had hit his head against the towel basin. After the event he did not have any confusional period, was back to baseline quickly, and did not have any further syncope, vision changes, or emesis. States that he has a headache over his forehead since then. He does not take blood thinners or antiplatelet agents. Well appearing on exam.    Ddx includes but is not limited to vasovagal syncope, arrhythmia, ACS, electrolyte abnormality, anemia, orthostatic process, dehydration.  CBC and CMP grossly unremarkable. EKG nonischemic and troponin negative. CXR without acute process. CT head and CT cervical spine negative for acute process. Given the patient's attestation that he was having a bowel movement and then fainted after standing up, etiology of syncope is likely vasovagal from straining vs orthostatic from standing up after, or a combination of both. In addition,  dehydration may have exacerbated this as the patient attested that he had not been drinking enough water and had been sick. He remained well appearing throughout encounter. The patient was counseled extensively and discharged with explicit return precautions and ambulatory referral to comprehensive concussion program.    Amount and/or Complexity of Data Reviewed  Labs: ordered.  Radiology: ordered and independent interpretation performed.    Risk  OTC drugs.             Medications   sodium chloride 0.9 % bolus 1,000 mL (0 mL Intravenous Stopped 2/26/25 0423)   acetaminophen (TYLENOL) tablet 975 mg (975 mg Oral Given 2/26/25 0252)       ED Risk Strat Scores                            SBIRT 20yo+      Flowsheet Row Most Recent Value   Initial Alcohol Screen: US AUDIT-C     1. How often do you have a drink containing alcohol? 0 Filed at: 02/26/2025 0424   2. How many drinks containing alcohol do you have on a typical day you are drinking?  0 Filed at: 02/26/2025 0424   3a. Male UNDER 65: How often do you have five or more drinks on one occasion? 0 Filed at: 02/26/2025 0424   Audit-C Score 0 Filed at: 02/26/2025 0424   DULCE: How many times in the past year have you...    Used an illegal drug or used a prescription medication for non-medical reasons? Never Filed at: 02/26/2025 0424                            History of Present Illness       Chief Complaint   Patient presents with    Fall     Pt went to the bathroom around 0130. SO heard a thud and found the pt on bathrrom floor. Pt states the last thing remembers is using the bathroom. +HS +LOC -Thinners/aspirin. Pt now c/o of a sharp headache. Denies N/V/visual changes        Past Medical History:   Diagnosis Date    Migraine       Past Surgical History:   Procedure Laterality Date    APPENDECTOMY LAPAROSCOPIC N/A 6/7/2023    Procedure: APPENDECTOMY LAPAROSCOPIC;  Surgeon: Timoteo Bernal DO;  Location: [facility];  Service: General      History reviewed. No  pertinent family history.   Social History     Tobacco Use    Smoking status: Never    Smokeless tobacco: Never   Substance Use Topics    Alcohol use: No    Drug use: No      E-Cigarette/Vaping      E-Cigarette/Vaping Substances      I have reviewed and agree with the history as documented.     Patient is a 39-year-old male who presented to the ED for syncopal fall.  Patient stated that he has been sick with what he thinks is a viral URI with cough and congestion for the last few days and because of this was feeling more weak than usual. Patient stated that he went to the bathroom at around 1:30AM and had a bowel movement after which he stood up and the next thing he remembered was waking up on the floor. His significant other who accompanied him stated that she heard a thud at this point and saw that he had hit his head against the towel basin. After the event he did not have any confusional period, was back to baseline quickly, and did not have any further syncope, vision changes, or emesis. States that he has a headache over his forehead since then. He does not take blood thinners or antiplatelet agents. Well appearing on exam.        Review of Systems   Constitutional:  Positive for fatigue. Negative for chills and fever.   HENT:  Positive for congestion.    Respiratory:  Positive for cough. Negative for shortness of breath.    Cardiovascular:  Negative for chest pain and palpitations.   Gastrointestinal:  Negative for abdominal pain, nausea and vomiting.   Genitourinary: Negative.    Skin:  Negative for color change and pallor.   Neurological:  Positive for syncope and headaches. Negative for dizziness.   Psychiatric/Behavioral: Negative.     All other systems reviewed and are negative.          Objective       ED Triage Vitals   Temperature Pulse Blood Pressure Respirations SpO2 Patient Position - Orthostatic VS   02/26/25 0250 02/26/25 0216 02/26/25 0216 02/26/25 0216 02/26/25 0216 02/26/25 0216   98.9 °F  (37.2 °C) 86 137/86 20 97 % Lying      Temp Source Heart Rate Source BP Location FiO2 (%) Pain Score    02/26/25 0250 02/26/25 0216 02/26/25 0216 -- 02/26/25 0222    Oral Monitor Right arm  7      Vitals      Date and Time Temp Pulse SpO2 Resp BP Pain Score FACES Pain Rating User   02/26/25 0430 -- 71 95 % 20 116/72 -- -- CG   02/26/25 0400 -- 73 95 % 20 131/75 -- -- CG   02/26/25 0300 -- 78 99 % 20 137/86 -- -- CG   02/26/25 0252 -- -- -- -- -- 5 -- CG   02/26/25 0250 98.9 °F (37.2 °C) -- -- -- -- -- -- CG   02/26/25 0227 -- -- -- -- -- 7 --    02/26/25 0222 -- -- -- -- -- 7 --    02/26/25 0216 -- 86 97 % 20 137/86 -- --             Physical Exam  On examination:  The patient is awake, alert and oriented  HEENT: Normocephalic. Small area of erythema across forehead.  External examination of the ears is unremarkable  Pupils are equal round and reactive to light, there is no conjunctival injection or scleral icterus noted  Nares are patent without rhinorrhea.  The oropharynx is moist without injection  The neck is supple  Lungs: Clear to auscultation bilaterally  Heart: Regular without murmurs rubs or gallops  Abdomen: Soft and nontender. There are positive bowel sounds. there is no rebound or guarding  Musculoskeletal: No CTL spine midline tenderness, step-offs, or deformities.   No tenderness over bony prominences including shoulders, hips, pelvis, legs, feet, hands, arms, sternum.  Neuro: Face symmetric, tongue midline, 5/5 strength in the proximal and distal upper and lower extremities bilaterally with intact sensation to light touch throughout.  CN II-XII intact. Normal finger-to-nose. Normal speech, normal gait.  Skin: No rash noted  Psych: Mood and affect normal      Results Reviewed       Procedure Component Value Units Date/Time    HS Troponin 0hr (reflex protocol) [[id number]]  (Normal) Collected: 02/26/25 0230    Lab Status: Final result Specimen: Blood from Arm, Left Updated: 02/26/25 0323     hs TnI  0hr <2 ng/L     Comprehensive metabolic panel [[id number]] Collected: 02/26/25 0230    Lab Status: Final result Specimen: Blood from Arm, Left Updated: 02/26/25 0315     Sodium 136 mmol/L      Potassium 3.7 mmol/L      Chloride 100 mmol/L      CO2 27 mmol/L      ANION GAP 9 mmol/L      BUN 10 mg/dL      Creatinine 1.03 mg/dL      Glucose 102 mg/dL      Calcium 9.1 mg/dL      AST 21 U/L      ALT 28 U/L      Alkaline Phosphatase 63 U/L      Total Protein 8.0 g/dL      Albumin 4.6 g/dL      Total Bilirubin 0.58 mg/dL      eGFR 90 ml/min/1.73sq m     Narrative:      [employer] guidelines for Chronic Kidney Disease (CKD):     Stage 1 with normal or high GFR (GFR > 90 mL/min/1.73 square meters)    Stage 2 Mild CKD (GFR = 60-89 mL/min/1.73 square meters)    Stage 3A Moderate CKD (GFR = 45-59 mL/min/1.73 square meters)    Stage 3B Moderate CKD (GFR = 30-44 mL/min/1.73 square meters)    Stage 4 Severe CKD (GFR = 15-29 mL/min/1.73 square meters)    Stage 5 End Stage CKD (GFR <15 mL/min/1.73 square meters)  Note: GFR calculation is accurate only with a steady state creatinine    CBC and differential [[id number]] Collected: 02/26/25 0230    Lab Status: Final result Specimen: Blood from Arm, Left Updated: 02/26/25 0308     WBC 6.21 Thousand/uL      RBC 5.27 Million/uL      Hemoglobin 15.0 g/dL      Hematocrit 45.6 %      MCV 87 fL      MCH 28.5 pg      MCHC 32.9 g/dL      RDW 13.1 %      MPV 9.3 fL      Platelets 207 Thousands/uL      nRBC 0 /100 WBCs      Segmented % 65 %      Immature Grans % 1 %      Lymphocytes % 25 %      Monocytes % 9 %      Eosinophils Relative 0 %      Basophils Relative 0 %      Absolute Neutrophils 4.09 Thousands/µL      Absolute Immature Grans 0.03 Thousand/uL      Absolute Lymphocytes 1.54 Thousands/µL      Absolute Monocytes 0.54 Thousand/µL      Eosinophils Absolute 0.00 Thousand/µL      Basophils Absolute 0.01 Thousands/µL     Fingerstick Glucose (POCT) [[id number]]  (Normal)  Collected: 02/26/25 0221    Lab Status: Final result Specimen: Blood Updated: 02/26/25 0221     POC Glucose 95 mg/dl             CT head without contrast   Final Interpretation by Manuelito Ascencio MD (02/26 0422)      No intracranial hemorrhage or calvarial fracture.                  Workstation performed: [id number]         CT spine cervical without contrast   Final Interpretation by Manuelito Ascencio MD (02/26 0429)      No cervical spine fracture or traumatic malalignment.                  Workstation performed: [id number]         XR chest 1 view portable   ED Interpretation by Gorge Oseguera MD (02/26 0352)   No acute cardiopulmonary process on my independent interpretation.      Final Interpretation by Kimberly Fish MD (02/26 0730)      No acute cardiopulmonary disease.            Workstation performed: [id number]             ECG 12 Lead Documentation Only    Date/Time: 2/26/2025 2:22 AM    Performed by: Gorge Oseguera MD  Authorized by: Gorge Oseguera MD    Indications / Diagnosis:  Syncope  ECG reviewed by me, the ED Provider: yes    Patient location:  ED  Previous ECG:     Comparison to cardiac monitor: Yes    Interpretation:     Interpretation: non-specific    Rate:     ECG rate:  80    ECG rate assessment: normal    Rhythm:     Rhythm: sinus rhythm    QRS:     QRS axis:  Left    QRS intervals:  Normal  Conduction:     Conduction: abnormal      Abnormal conduction: incomplete RBBB    ST segments:     ST segments:  Normal  T waves:     T waves: normal        ED Medication and Procedure Management   Prior to Admission Medications   Prescriptions Last Dose Informant Patient Reported? Taking?   ondansetron (ZOFRAN-ODT) 4 mg disintegrating tablet   No No   Sig: Take 1 tablet (4 mg total) by mouth every 8 (eight) hours as needed for nausea or vomiting for up to 7 days      Facility-Administered Medications: None     Discharge Medication List as of 2/26/2025  4:52 AM        CONTINUE these medications which have  NOT CHANGED    Details   ondansetron (ZOFRAN-ODT) 4 mg disintegrating tablet Take 1 tablet (4 mg total) by mouth every 8 (eight) hours as needed for nausea or vomiting for up to 7 days, Starting Wed 11/16/2022, Until Wed 11/23/2022 at 2359, Normal             ED SEPSIS DOCUMENTATION   Time reflects when diagnosis was documented in both MDM as applicable and the Disposition within this note       Time User Action Codes Description Comment    2/26/2025  4:49 AM Gorge Oseguera [R55] Syncope     2/26/2025  4:50 AM Gorge Oseguera [S09.90XA] Minor head injury, initial encounter                  Gorge Oseguera MD  02/28/25 1112

## 2025-02-27 LAB
ATRIAL RATE: 80 BPM
P AXIS: 64 DEGREES
PR INTERVAL: 146 MS
QRS AXIS: -68 DEGREES
QRSD INTERVAL: 102 MS
QT INTERVAL: 364 MS
QTC INTERVAL: 419 MS
T WAVE AXIS: 55 DEGREES
VENTRICULAR RATE: 80 BPM

## 2025-02-27 PROCEDURE — 93010 ELECTROCARDIOGRAM REPORT: CPT | Performed by: INTERNAL MEDICINE

## (undated) DEVICE — TROCAR: Brand: KII FIOS FIRST ENTRY

## (undated) DEVICE — GLOVE SRG BIOGEL ORTHOPEDIC 8

## (undated) DEVICE — NEEDLE 22 G X 1 1/2 SAFETY

## (undated) DEVICE — HARMONIC 1100 SHEARS, 36CM SHAFT LENGTH: Brand: HARMONIC

## (undated) DEVICE — ADHESIVE SKIN HIGH VISCOSITY EXOFIN 1ML

## (undated) DEVICE — PACK PBDS LAP CHOLE RF

## (undated) DEVICE — LIGAMAX 5 MM ENDOSCOPIC MULTIPLE CLIP APPLIER: Brand: LIGAMAX

## (undated) DEVICE — TRAY FOLEY 16FR URIMETER SURESTEP

## (undated) DEVICE — INTENDED FOR TISSUE SEPARATION, AND OTHER PROCEDURES THAT REQUIRE A SHARP SURGICAL BLADE TO PUNCTURE OR CUT.: Brand: BARD-PARKER SAFETY BLADES SIZE 11, STERILE

## (undated) DEVICE — ENDOPATH ETS-FLEX45 ARTICULATING ENDOSCOPIC LINEAR CUTTER, NO RELOAD: Brand: ENDOPATH

## (undated) DEVICE — TOWEL SURG XR DETECT GREEN STRL RFD

## (undated) DEVICE — SUT VICRYL 0 UR-6 27 IN J603H

## (undated) DEVICE — DECANTER: Brand: UNBRANDED

## (undated) DEVICE — ETS45 RELOAD STANDARD 45MM: Brand: ENDOPATH

## (undated) DEVICE — SUT MONOCRYL 4-0 PS-2 27 IN Y426H

## (undated) DEVICE — TROCAR: Brand: KII® SLEEVE

## (undated) DEVICE — TISSUE RETRIEVAL SYSTEM: Brand: INZII RETRIEVAL SYSTEM

## (undated) DEVICE — DRAPE EQUIPMENT RF WAND